# Patient Record
Sex: FEMALE | Race: WHITE | NOT HISPANIC OR LATINO | Employment: OTHER | URBAN - METROPOLITAN AREA
[De-identification: names, ages, dates, MRNs, and addresses within clinical notes are randomized per-mention and may not be internally consistent; named-entity substitution may affect disease eponyms.]

---

## 2023-01-19 PROBLEM — N95.1 POST MENOPAUSAL SYNDROME: Status: ACTIVE | Noted: 2023-01-19

## 2023-01-19 PROBLEM — E66.811 CLASS 1 OBESITY WITH BODY MASS INDEX (BMI) OF 30.0 TO 30.9 IN ADULT: Status: ACTIVE | Noted: 2023-01-19

## 2023-01-19 PROBLEM — E66.9 CLASS 1 OBESITY WITH BODY MASS INDEX (BMI) OF 30.0 TO 30.9 IN ADULT: Status: ACTIVE | Noted: 2023-01-19

## 2023-01-19 PROBLEM — I67.89 CEREBRAL MICROVASCULAR DISEASE: Status: ACTIVE | Noted: 2023-01-19

## 2023-01-19 PROBLEM — N18.31 STAGE 3A CHRONIC KIDNEY DISEASE (MULTI): Status: ACTIVE | Noted: 2023-01-19

## 2023-01-19 PROBLEM — R51.9 HEADACHE: Status: ACTIVE | Noted: 2023-01-19

## 2023-01-19 PROBLEM — E11.22 TYPE 2 DIABETES MELLITUS WITH STAGE 3A CHRONIC KIDNEY DISEASE, WITHOUT LONG-TERM CURRENT USE OF INSULIN (MULTI): Status: ACTIVE | Noted: 2023-01-19

## 2023-01-19 PROBLEM — D64.9 ANEMIA: Status: ACTIVE | Noted: 2023-01-19

## 2023-01-19 PROBLEM — E78.5 HYPERLIPIDEMIA: Status: ACTIVE | Noted: 2023-01-19

## 2023-01-19 PROBLEM — M77.8 TENDONITIS OF WRIST, RIGHT: Status: ACTIVE | Noted: 2023-01-19

## 2023-01-19 PROBLEM — K63.5 COLON POLYP: Status: ACTIVE | Noted: 2023-01-19

## 2023-01-19 PROBLEM — E78.5 HYPERLIPIDEMIA ASSOCIATED WITH TYPE 2 DIABETES MELLITUS (MULTI): Status: ACTIVE | Noted: 2023-01-19

## 2023-01-19 PROBLEM — K76.0 FATTY LIVER: Status: ACTIVE | Noted: 2023-01-19

## 2023-01-19 PROBLEM — E55.9 VITAMIN D DEFICIENCY: Status: ACTIVE | Noted: 2023-01-19

## 2023-01-19 PROBLEM — I10 BENIGN HYPERTENSION: Status: ACTIVE | Noted: 2023-01-19

## 2023-01-19 PROBLEM — N18.31 TYPE 2 DIABETES MELLITUS WITH STAGE 3A CHRONIC KIDNEY DISEASE, WITHOUT LONG-TERM CURRENT USE OF INSULIN (MULTI): Status: ACTIVE | Noted: 2023-01-19

## 2023-01-19 PROBLEM — M25.50 ARTHRALGIA: Status: ACTIVE | Noted: 2023-01-19

## 2023-01-19 PROBLEM — E11.69 HYPERLIPIDEMIA ASSOCIATED WITH TYPE 2 DIABETES MELLITUS (MULTI): Status: ACTIVE | Noted: 2023-01-19

## 2023-01-19 RX ORDER — CHOLECALCIFEROL (VITAMIN D3) 125 MCG
1 CAPSULE ORAL DAILY
COMMUNITY

## 2023-01-19 RX ORDER — LISINOPRIL 20 MG/1
1 TABLET ORAL DAILY
COMMUNITY
Start: 2015-05-21 | End: 2023-03-28 | Stop reason: SDUPTHER

## 2023-01-19 RX ORDER — FERROUS GLUCONATE 324(38)MG
1 TABLET ORAL EVERY OTHER DAY
COMMUNITY
Start: 2022-07-18 | End: 2024-05-08 | Stop reason: ALTCHOICE

## 2023-01-19 RX ORDER — METFORMIN HYDROCHLORIDE 500 MG/1
1 TABLET, EXTENDED RELEASE ORAL DAILY
COMMUNITY
Start: 2022-07-18 | End: 2023-04-03 | Stop reason: SDUPTHER

## 2023-01-19 RX ORDER — ATORVASTATIN CALCIUM 80 MG/1
1 TABLET, FILM COATED ORAL DAILY
COMMUNITY
Start: 2020-12-30 | End: 2023-03-28 | Stop reason: SDUPTHER

## 2023-01-19 RX ORDER — AMLODIPINE BESYLATE 10 MG/1
1 TABLET ORAL DAILY
COMMUNITY
Start: 2021-01-04 | End: 2023-03-28 | Stop reason: SDUPTHER

## 2023-01-19 RX ORDER — BACLOFEN 20 MG
1 TABLET ORAL DAILY
COMMUNITY
Start: 2019-12-09 | End: 2024-05-08 | Stop reason: ALTCHOICE

## 2023-01-19 RX ORDER — MULTIVIT-MIN/IRON/FOLIC ACID/K 18-600-40
1 CAPSULE ORAL EVERY OTHER DAY
COMMUNITY
Start: 2019-12-09 | End: 2023-11-01 | Stop reason: ALTCHOICE

## 2023-01-19 RX ORDER — ASPIRIN 81 MG/1
1 TABLET ORAL
COMMUNITY
Start: 2015-05-21

## 2023-03-07 ENCOUNTER — APPOINTMENT (OUTPATIENT)
Dept: PRIMARY CARE | Facility: CLINIC | Age: 68
End: 2023-03-07
Payer: MEDICARE

## 2023-03-28 DIAGNOSIS — E78.5 HYPERLIPIDEMIA ASSOCIATED WITH TYPE 2 DIABETES MELLITUS (MULTI): ICD-10-CM

## 2023-03-28 DIAGNOSIS — E11.69 HYPERLIPIDEMIA ASSOCIATED WITH TYPE 2 DIABETES MELLITUS (MULTI): ICD-10-CM

## 2023-03-28 DIAGNOSIS — I10 BENIGN HYPERTENSION: ICD-10-CM

## 2023-03-28 RX ORDER — LISINOPRIL 20 MG/1
20 TABLET ORAL DAILY
Qty: 90 TABLET | Refills: 0 | Status: SHIPPED | OUTPATIENT
Start: 2023-03-28 | End: 2023-04-03 | Stop reason: SDUPTHER

## 2023-03-28 RX ORDER — AMLODIPINE BESYLATE 10 MG/1
10 TABLET ORAL DAILY
Qty: 90 TABLET | Refills: 0 | Status: SHIPPED | OUTPATIENT
Start: 2023-03-28 | End: 2023-04-03 | Stop reason: SDUPTHER

## 2023-03-28 RX ORDER — ATORVASTATIN CALCIUM 80 MG/1
80 TABLET, FILM COATED ORAL DAILY
Qty: 90 TABLET | Refills: 0 | Status: SHIPPED | OUTPATIENT
Start: 2023-03-28 | End: 2023-04-03 | Stop reason: SDUPTHER

## 2023-03-28 RX ORDER — LANOLIN ALCOHOL/MO/W.PET/CERES
CREAM (GRAM) TOPICAL
COMMUNITY
End: 2024-05-08 | Stop reason: ALTCHOICE

## 2023-03-28 ASSESSMENT — ENCOUNTER SYMPTOMS
LOSS OF SENSATION IN FEET: 0
DEPRESSION: 0
OCCASIONAL FEELINGS OF UNSTEADINESS: 0

## 2023-04-01 LAB
ALANINE AMINOTRANSFERASE (SGPT) (U/L) IN SER/PLAS: 11 U/L (ref 7–45)
ALBUMIN (G/DL) IN SER/PLAS: 4.3 G/DL (ref 3.4–5)
ALKALINE PHOSPHATASE (U/L) IN SER/PLAS: 53 U/L (ref 33–136)
ANION GAP IN SER/PLAS: 11 MMOL/L (ref 10–20)
ASPARTATE AMINOTRANSFERASE (SGOT) (U/L) IN SER/PLAS: 15 U/L (ref 9–39)
BASOPHILS (10*3/UL) IN BLOOD BY AUTOMATED COUNT: 0.05 X10E9/L (ref 0–0.1)
BASOPHILS/100 LEUKOCYTES IN BLOOD BY AUTOMATED COUNT: 1.3 % (ref 0–2)
BILIRUBIN TOTAL (MG/DL) IN SER/PLAS: 0.9 MG/DL (ref 0–1.2)
CALCIDIOL (25 OH VITAMIN D3) (NG/ML) IN SER/PLAS: 62 NG/ML
CALCIUM (MG/DL) IN SER/PLAS: 9.7 MG/DL (ref 8.6–10.6)
CARBON DIOXIDE, TOTAL (MMOL/L) IN SER/PLAS: 28 MMOL/L (ref 21–32)
CHLORIDE (MMOL/L) IN SER/PLAS: 103 MMOL/L (ref 98–107)
CHOLESTEROL (MG/DL) IN SER/PLAS: 146 MG/DL (ref 0–199)
CHOLESTEROL IN HDL (MG/DL) IN SER/PLAS: 58.1 MG/DL
CHOLESTEROL/HDL RATIO: 2.5
CREATININE (MG/DL) IN SER/PLAS: 1 MG/DL (ref 0.5–1.05)
EOSINOPHILS (10*3/UL) IN BLOOD BY AUTOMATED COUNT: 0.24 X10E9/L (ref 0–0.7)
EOSINOPHILS/100 LEUKOCYTES IN BLOOD BY AUTOMATED COUNT: 6.5 % (ref 0–6)
ERYTHROCYTE DISTRIBUTION WIDTH (RATIO) BY AUTOMATED COUNT: 14.7 % (ref 11.5–14.5)
ERYTHROCYTE MEAN CORPUSCULAR HEMOGLOBIN CONCENTRATION (G/DL) BY AUTOMATED: 32.5 G/DL (ref 32–36)
ERYTHROCYTE MEAN CORPUSCULAR VOLUME (FL) BY AUTOMATED COUNT: 88 FL (ref 80–100)
ERYTHROCYTES (10*6/UL) IN BLOOD BY AUTOMATED COUNT: 4.26 X10E12/L (ref 4–5.2)
ESTIMATED AVERAGE GLUCOSE FOR HBA1C: 131 MG/DL
GFR FEMALE: 61 ML/MIN/1.73M2
GLUCOSE (MG/DL) IN SER/PLAS: 99 MG/DL (ref 74–99)
HEMATOCRIT (%) IN BLOOD BY AUTOMATED COUNT: 37.5 % (ref 36–46)
HEMOGLOBIN (G/DL) IN BLOOD: 12.2 G/DL (ref 12–16)
HEMOGLOBIN A1C/HEMOGLOBIN TOTAL IN BLOOD: 6.2 %
IMMATURE GRANULOCYTES/100 LEUKOCYTES IN BLOOD BY AUTOMATED COUNT: 0.3 % (ref 0–0.9)
LDL: 71 MG/DL (ref 0–99)
LEUKOCYTES (10*3/UL) IN BLOOD BY AUTOMATED COUNT: 3.7 X10E9/L (ref 4.4–11.3)
LYMPHOCYTES (10*3/UL) IN BLOOD BY AUTOMATED COUNT: 1.69 X10E9/L (ref 1.2–4.8)
LYMPHOCYTES/100 LEUKOCYTES IN BLOOD BY AUTOMATED COUNT: 45.4 % (ref 13–44)
MONOCYTES (10*3/UL) IN BLOOD BY AUTOMATED COUNT: 0.46 X10E9/L (ref 0.1–1)
MONOCYTES/100 LEUKOCYTES IN BLOOD BY AUTOMATED COUNT: 12.4 % (ref 2–10)
NEUTROPHILS (10*3/UL) IN BLOOD BY AUTOMATED COUNT: 1.27 X10E9/L (ref 1.2–7.7)
NEUTROPHILS/100 LEUKOCYTES IN BLOOD BY AUTOMATED COUNT: 34.1 % (ref 40–80)
NRBC (PER 100 WBCS) BY AUTOMATED COUNT: 0 /100 WBC (ref 0–0)
PLATELETS (10*3/UL) IN BLOOD AUTOMATED COUNT: 202 X10E9/L (ref 150–450)
POTASSIUM (MMOL/L) IN SER/PLAS: 4.3 MMOL/L (ref 3.5–5.3)
PROTEIN TOTAL: 6.9 G/DL (ref 6.4–8.2)
SODIUM (MMOL/L) IN SER/PLAS: 138 MMOL/L (ref 136–145)
TRIGLYCERIDE (MG/DL) IN SER/PLAS: 87 MG/DL (ref 0–149)
URATE (MG/DL) IN SER/PLAS: 5.9 MG/DL (ref 2.3–6.7)
UREA NITROGEN (MG/DL) IN SER/PLAS: 19 MG/DL (ref 6–23)
VLDL: 17 MG/DL (ref 0–40)

## 2023-04-03 ENCOUNTER — OFFICE VISIT (OUTPATIENT)
Dept: PRIMARY CARE | Facility: CLINIC | Age: 68
End: 2023-04-03
Payer: MEDICARE

## 2023-04-03 VITALS
TEMPERATURE: 97.2 F | OXYGEN SATURATION: 98 % | WEIGHT: 167 LBS | HEIGHT: 64 IN | RESPIRATION RATE: 16 BRPM | BODY MASS INDEX: 28.51 KG/M2 | DIASTOLIC BLOOD PRESSURE: 70 MMHG | HEART RATE: 65 BPM | SYSTOLIC BLOOD PRESSURE: 124 MMHG

## 2023-04-03 DIAGNOSIS — E11.22 TYPE 2 DIABETES MELLITUS WITH STAGE 3A CHRONIC KIDNEY DISEASE, WITHOUT LONG-TERM CURRENT USE OF INSULIN (MULTI): ICD-10-CM

## 2023-04-03 DIAGNOSIS — Z12.11 SCREENING FOR COLORECTAL CANCER: ICD-10-CM

## 2023-04-03 DIAGNOSIS — Z13.6 SCREENING FOR CARDIOVASCULAR CONDITION: ICD-10-CM

## 2023-04-03 DIAGNOSIS — I10 BENIGN HYPERTENSION: ICD-10-CM

## 2023-04-03 DIAGNOSIS — Z11.59 NEED FOR HEPATITIS C SCREENING TEST: ICD-10-CM

## 2023-04-03 DIAGNOSIS — E78.5 HYPERLIPIDEMIA ASSOCIATED WITH TYPE 2 DIABETES MELLITUS (MULTI): ICD-10-CM

## 2023-04-03 DIAGNOSIS — Z11.4 SCREENING FOR HIV WITHOUT PRESENCE OF RISK FACTORS: ICD-10-CM

## 2023-04-03 DIAGNOSIS — Z13.89 SCREENING FOR MULTIPLE CONDITIONS: ICD-10-CM

## 2023-04-03 DIAGNOSIS — Z00.00 ROUTINE GENERAL MEDICAL EXAMINATION AT A HEALTH CARE FACILITY: ICD-10-CM

## 2023-04-03 DIAGNOSIS — N18.31 TYPE 2 DIABETES MELLITUS WITH STAGE 3A CHRONIC KIDNEY DISEASE, WITHOUT LONG-TERM CURRENT USE OF INSULIN (MULTI): ICD-10-CM

## 2023-04-03 DIAGNOSIS — Z12.12 SCREENING FOR COLORECTAL CANCER: ICD-10-CM

## 2023-04-03 DIAGNOSIS — Z00.00 ROUTINE GENERAL MEDICAL EXAMINATION AT HEALTH CARE FACILITY: Primary | ICD-10-CM

## 2023-04-03 DIAGNOSIS — Z78.0 ASYMPTOMATIC MENOPAUSAL STATE: ICD-10-CM

## 2023-04-03 DIAGNOSIS — E11.69 HYPERLIPIDEMIA ASSOCIATED WITH TYPE 2 DIABETES MELLITUS (MULTI): ICD-10-CM

## 2023-04-03 PROBLEM — E66.9 CLASS 1 OBESITY WITH BODY MASS INDEX (BMI) OF 30.0 TO 30.9 IN ADULT: Status: RESOLVED | Noted: 2023-01-19 | Resolved: 2023-04-03

## 2023-04-03 PROBLEM — E66.811 CLASS 1 OBESITY WITH BODY MASS INDEX (BMI) OF 30.0 TO 30.9 IN ADULT: Status: RESOLVED | Noted: 2023-01-19 | Resolved: 2023-04-03

## 2023-04-03 PROCEDURE — 1170F FXNL STATUS ASSESSED: CPT | Performed by: FAMILY MEDICINE

## 2023-04-03 PROCEDURE — 2028F FOOT EXAM PERFORMED: CPT | Performed by: FAMILY MEDICINE

## 2023-04-03 PROCEDURE — 4010F ACE/ARB THERAPY RXD/TAKEN: CPT | Performed by: FAMILY MEDICINE

## 2023-04-03 PROCEDURE — 3074F SYST BP LT 130 MM HG: CPT | Performed by: FAMILY MEDICINE

## 2023-04-03 PROCEDURE — 99397 PER PM REEVAL EST PAT 65+ YR: CPT | Performed by: FAMILY MEDICINE

## 2023-04-03 PROCEDURE — 1160F RVW MEDS BY RX/DR IN RCRD: CPT | Performed by: FAMILY MEDICINE

## 2023-04-03 PROCEDURE — 99497 ADVNCD CARE PLAN 30 MIN: CPT | Performed by: FAMILY MEDICINE

## 2023-04-03 PROCEDURE — 1159F MED LIST DOCD IN RCRD: CPT | Performed by: FAMILY MEDICINE

## 2023-04-03 PROCEDURE — 3078F DIAST BP <80 MM HG: CPT | Performed by: FAMILY MEDICINE

## 2023-04-03 PROCEDURE — 99214 OFFICE O/P EST MOD 30 MIN: CPT | Performed by: FAMILY MEDICINE

## 2023-04-03 PROCEDURE — 1036F TOBACCO NON-USER: CPT | Performed by: FAMILY MEDICINE

## 2023-04-03 PROCEDURE — 3044F HG A1C LEVEL LT 7.0%: CPT | Performed by: FAMILY MEDICINE

## 2023-04-03 PROCEDURE — G0439 PPPS, SUBSEQ VISIT: HCPCS | Performed by: FAMILY MEDICINE

## 2023-04-03 RX ORDER — AMLODIPINE BESYLATE 10 MG/1
10 TABLET ORAL DAILY
Qty: 90 TABLET | Refills: 1 | Status: SHIPPED | OUTPATIENT
Start: 2023-04-03 | End: 2023-12-13

## 2023-04-03 RX ORDER — ATORVASTATIN CALCIUM 80 MG/1
80 TABLET, FILM COATED ORAL DAILY
Qty: 90 TABLET | Refills: 1 | Status: SHIPPED | OUTPATIENT
Start: 2023-04-03 | End: 2023-07-03

## 2023-04-03 RX ORDER — METFORMIN HYDROCHLORIDE 500 MG/1
500 TABLET, EXTENDED RELEASE ORAL DAILY
Qty: 90 TABLET | Refills: 1 | Status: SHIPPED | OUTPATIENT
Start: 2023-04-03 | End: 2023-10-03 | Stop reason: SDUPTHER

## 2023-04-03 RX ORDER — LISINOPRIL 20 MG/1
20 TABLET ORAL DAILY
Qty: 90 TABLET | Refills: 1 | Status: SHIPPED | OUTPATIENT
Start: 2023-04-03 | End: 2023-06-24

## 2023-04-03 ASSESSMENT — ENCOUNTER SYMPTOMS
MYALGIAS: 0
AGITATION: 0
DIAPHORESIS: 0
FLANK PAIN: 0
LOSS OF SENSATION IN FEET: 0
DYSURIA: 0
EYE REDNESS: 0
APPETITE CHANGE: 0
VOMITING: 0
CHEST TIGHTNESS: 0
SORE THROAT: 0
ARTHRALGIAS: 0
FEVER: 0
SINUS PRESSURE: 0
TROUBLE SWALLOWING: 0
BACK PAIN: 0
DIARRHEA: 0
NERVOUS/ANXIOUS: 0
RHINORRHEA: 0
JOINT SWELLING: 0
PALPITATIONS: 0
SLEEP DISTURBANCE: 0
COLOR CHANGE: 0
WOUND: 0
EYE ITCHING: 0
FACIAL SWELLING: 0
NAUSEA: 0
SHORTNESS OF BREATH: 0
SINUS PAIN: 0
NECK STIFFNESS: 0
POLYDIPSIA: 0
CHILLS: 0
FREQUENCY: 0
EYE DISCHARGE: 0
UNEXPECTED WEIGHT CHANGE: 0
BRUISES/BLEEDS EASILY: 0
OCCASIONAL FEELINGS OF UNSTEADINESS: 0
ABDOMINAL PAIN: 0
FATIGUE: 0
BLOOD IN STOOL: 0
VOICE CHANGE: 0
POLYPHAGIA: 0
COUGH: 0
HEMATURIA: 0
CONSTIPATION: 0
EYE PAIN: 0
DIZZINESS: 0
DEPRESSION: 0
ACTIVITY CHANGE: 0
WHEEZING: 0
ADENOPATHY: 0

## 2023-04-03 ASSESSMENT — ACTIVITIES OF DAILY LIVING (ADL)
TAKING_MEDICATION: INDEPENDENT
DOING_HOUSEWORK: INDEPENDENT
GROCERY_SHOPPING: INDEPENDENT
MANAGING_FINANCES: INDEPENDENT
DRESSING: INDEPENDENT
BATHING: INDEPENDENT

## 2023-04-03 ASSESSMENT — PATIENT HEALTH QUESTIONNAIRE - PHQ9
SUM OF ALL RESPONSES TO PHQ9 QUESTIONS 1 AND 2: 0
1. LITTLE INTEREST OR PLEASURE IN DOING THINGS: NOT AT ALL
2. FEELING DOWN, DEPRESSED OR HOPELESS: NOT AT ALL

## 2023-04-03 NOTE — PATIENT INSTRUCTIONS
Your doctor may have recommended that you participate in a CDC-recognized lifestyle change program. If your physician made a referral to the program please follow the scheduling instructions that you received to schedule your appointment.     For more information about the program please also review the document located here: [email or web address]://[email or web address]-[email or web address]/doc/prevent-diabetes-[email or web address]-you-[email or web address]-prediabetes-now-what.[email or web address]     Prediabetes: After Your Visit Your Care Instructions     Prediabetes is a warning sign that you are at risk for getting type 2 diabetes. It means that your blood sugar is higher than it should be. Most people who get type 2 diabetes have prediabetes first. The good news is that lifestyle changes may help you get your blood sugar back to normal and avoid or delay diabetes. Also, pregnant women who get gestational diabetes may have prediabetes first.     Type 2 diabetes is a lifelong disease in which the body does not respond properly to a hormone called insulin or does not make enough of the hormone. Insulin helps sugar from your food enter your body cells to be used as energy.     Without insulin, the sugar cannot get into the cells to do its work. It stays in the blood instead. This can cause high blood sugar levels. A person has diabetes when the blood sugar stays too high too much of the time.     Follow-up care is a key part of your treatment and safety. Be sure to make and go to all appointments, and call your doctor if you are having problems. It’s also a good idea to know your test results and keep a list of the medicines you take.   How can you care for yourself at home?     · Watch your weight. A healthy weight helps your body use insulin properly.   · Eat a balanced diet. This may help you prevent or delay diabetes. Try to eat an even amount of carbohydrate throughout the day. This can help you avoid sudden peaks in blood sugar.   · Ask your doctor if you should see a dietitian.  A registered dietitian can help you develop a meal plan that fits your lifestyle.   · Get at least 30 minutes of exercise on most days of the week. Exercise helps control your blood sugar. It also helps you maintain a healthy weight. Walking is a good choice. You also may want to do other activities, such as running, swimming, cycling, or playing tennis or team sports.   · Do not smoke. Smoking can make prediabetes worse. If you need help quitting, talk to your doctor about stop-smoking programs and medicines. These can increase your chances of quitting for good.   · If your doctor prescribed medicines, take them exactly as prescribed. Call your doctor if you think you are having a problem with your medicine. You will get more details on the specific medicines your doctor prescribes.     When should you call for help?   Watch closely for changes in your health, and be sure to contact your doctor if:   · You have any symptoms of diabetes. These may include:   ¨ Being thirsty more often.   ¨ Urinating more.   ¨ Being hungrier.   ¨ Losing weight.   ¨ Being very tired.   ¨ Having blurry vision.   · You have a wound that will not heal.   · You have an infection that will not go away.   · You have problems with your blood pressure.   · You want more information about diabetes and how you can keep from getting it.

## 2023-04-03 NOTE — PROGRESS NOTES
Below is the patient's most recent value for Albumin, ALT, AST, BUN, Calcium, Chloride, Cholesterol, CO2, Creatinine, GFR, Glucose, HDL, Hematocrit, Hemoglobin, Hemoglobin A1C, LDL, Magnesium, Phosphorus, Platelets, Potassium, PSA, Sodium, Triglycerides, and WBC.   Lab Results   Component Value Date    ALBUMIN 4.3 04/01/2023    ALT 11 04/01/2023    AST 15 04/01/2023    BUN 19 04/01/2023    CALCIUM 9.7 04/01/2023     04/01/2023    CHOL 146 04/01/2023    CO2 28 04/01/2023    CREATININE 1.00 04/01/2023    HDL 58.1 04/01/2023    HCT 37.5 04/01/2023    HGB 12.2 04/01/2023    HGBA1C 6.2 (A) 04/01/2023    MG 1.99 11/21/2022     04/01/2023    K 4.3 04/01/2023     04/01/2023    TRIG 87 04/01/2023    WBC 3.7 (L) 04/01/2023     Note: for a comprehensive list of the patient's lab results, access the Results Review activity.Subjective   Reason for Visit: Grecia Pineda is an 68 y.o. female here for a Medicare Wellness visit.     Past Medical, Surgical, and Family History reviewed and updated in chart.    Reviewed all medications by prescribing practitioner or clinical pharmacist (such as prescriptions, OTCs, herbal therapies and supplements) and documented in the medical record.    HPI    Patient Care Team:  Melva Cobb MD as PCP - General  Melva Cobb MD as PCP - [employer] PCP     Review of Systems   Constitutional:  Negative for activity change, appetite change, chills, diaphoresis, fatigue, fever and unexpected weight change.   HENT:  Negative for congestion, dental problem, drooling, ear discharge, ear pain, facial swelling, hearing loss, nosebleeds, postnasal drip, rhinorrhea, sinus pressure, sinus pain, sneezing, sore throat, tinnitus, trouble swallowing and voice change.    Eyes:  Negative for pain, discharge, redness, itching and visual disturbance.   Respiratory:  Negative for cough, chest tightness, shortness of breath and wheezing.    Cardiovascular:  Negative for chest pain,  "palpitations and leg swelling.   Gastrointestinal:  Negative for abdominal pain, blood in stool, constipation, diarrhea, nausea and vomiting.   Endocrine: Negative for cold intolerance, heat intolerance, polydipsia, polyphagia and polyuria.   Genitourinary:  Negative for decreased urine volume, dysuria, flank pain, frequency, hematuria and urgency.   Musculoskeletal:  Negative for arthralgias, back pain, gait problem, joint swelling, myalgias and neck stiffness.   Skin:  Negative for color change, pallor, rash and wound.   Neurological:  Negative for dizziness.   Hematological:  Negative for adenopathy. Does not bruise/bleed easily.   Psychiatric/Behavioral:  Negative for agitation, behavioral problems and sleep disturbance. The patient is not nervous/anxious.        Objective   Vitals:  /70   Pulse 65   Temp 36.2 °C (97.2 °F)   Resp 16   Ht 1.626 m (5' 4\")   Wt 75.8 kg (167 lb)   SpO2 98%   BMI 28.67 kg/m²       Physical Exam  Vitals and nursing note reviewed.   Constitutional:       General: She is not in acute distress.     Appearance: Normal appearance. She is normal weight. She is not ill-appearing or toxic-appearing.   HENT:      Head: Normocephalic.      Right Ear: Tympanic membrane, ear canal and external ear normal. There is no impacted cerumen.      Left Ear: Tympanic membrane, ear canal and external ear normal. There is no impacted cerumen.      Nose: Nose normal. No congestion or rhinorrhea.      Mouth/Throat:      Mouth: Mucous membranes are moist.      Pharynx: Oropharynx is clear. No oropharyngeal exudate or posterior oropharyngeal erythema.   Eyes:      General: No scleral icterus.        Right eye: No discharge.         Left eye: No discharge.      Extraocular Movements: Extraocular movements intact.      Conjunctiva/sclera: Conjunctivae normal.      Pupils: Pupils are equal, round, and reactive to light.   Neck:      Vascular: No carotid bruit.   Cardiovascular:      Rate and Rhythm: " Normal rate and regular rhythm.      Pulses: Normal pulses.      Heart sounds: Normal heart sounds. No murmur heard.     No gallop.   Pulmonary:      Effort: No respiratory distress.      Breath sounds: No wheezing or rhonchi.   Chest:      Chest wall: No tenderness.   Abdominal:      General: Abdomen is flat. Bowel sounds are normal.      Palpations: Abdomen is soft. There is no mass.      Tenderness: There is no abdominal tenderness. There is no guarding or rebound.      Hernia: No hernia is present.   Musculoskeletal:         General: No swelling or tenderness. Normal range of motion.      Cervical back: Normal range of motion. No rigidity or tenderness.      Right lower leg: No edema.      Left lower leg: No edema.   Lymphadenopathy:      Cervical: No cervical adenopathy.   Skin:     General: Skin is warm and dry.      Coloration: Skin is not pale.      Findings: No bruising, erythema or rash.   Neurological:      General: No focal deficit present.      Mental Status: She is alert and oriented to person, place, and time.      Sensory: No sensory deficit.      Coordination: Coordination normal.      Gait: Gait normal.      Deep Tendon Reflexes: Reflexes normal.   Psychiatric:         Mood and Affect: Mood normal.         Behavior: Behavior normal.         Thought Content: Thought content normal.         Judgment: Judgment normal.         Assessment/Plan   Problem List Items Addressed This Visit       Benign hypertension    Relevant Medications    lisinopril 20 mg tablet    amLODIPine (Norvasc) 10 mg tablet    Hyperlipidemia associated with type 2 diabetes mellitus (CMS/HCC)    Relevant Medications    atorvastatin (Lipitor) 80 mg tablet    Type 2 diabetes mellitus with stage 3a chronic kidney disease, without long-term current use of insulin (CMS/HCC)    Relevant Medications    metFORMIN XR (Glucophage-XR) 500 mg 24 hr tablet    Other Relevant Orders     Diabetes Foot Exam (Completed)    3 Month Follow Up In  [facility] - P[facility]     Other Visit Diagnoses       Routine general medical examination at health care facility    -  Primary    Screening for multiple conditions        Screening for cardiovascular condition        Asymptomatic menopausal state        Need for hepatitis C screening test        Relevant Orders    Hepatitis C antibody    Routine general medical examination at a health care facility        Screening for colorectal cancer        Relevant Orders    Fecal Occult Bld Immunoassay    Screening for HIV without presence of risk factors        Relevant Orders    HIV-1 and HIV-2 antibodies        Patient has some concerns about leg cramps.  Advised her to adjust the way she sits in bed.  Continue wearing good supportive shoes.  May be drink some electrolytes on the days she is very busy.  Call back if this continues may need physical therapy or an x-ray of her lower back.    We will follow-up with her in 3 months for diabetes checkup.  Need [facility] exam [facility]  Did advise her to get shingles vaccine  Discussed calcium scoring test however she would like to wait until next visit.

## 2023-04-03 NOTE — PROGRESS NOTES
"Subjective   Patient ID: Grecia Pineda is a 68 y.o. female who presents for Medicare Annual Wellness Visit Subsequent.  Today she is accompanied by {alone or w :808324}.     HPI    Review of Systems    Objective   /70   Pulse 65   Temp 36.2 °C (97.2 °F)   Resp 16   Ht 1.626 m (5' 4\")   Wt 75.8 kg (167 lb)   SpO2 98%   BMI 28.67 kg/m²   BSA: 1.85 meters squared  Growth percentiles: Facility age limit for growth %kimberlyn is 20 years. Facility age limit for growth %kimberlyn is 20 years.     Physical Exam    Assessment/Plan   {Assess/PlanSmartLinks:40045}  "

## 2023-06-23 DIAGNOSIS — I10 BENIGN HYPERTENSION: ICD-10-CM

## 2023-06-24 RX ORDER — LISINOPRIL 20 MG/1
TABLET ORAL
Qty: 90 TABLET | Refills: 0 | Status: SHIPPED | OUTPATIENT
Start: 2023-06-24 | End: 2023-12-13

## 2023-06-29 ENCOUNTER — LAB (OUTPATIENT)
Dept: LAB | Facility: LAB | Age: 68
End: 2023-06-29
Payer: MEDICARE

## 2023-06-29 DIAGNOSIS — Z11.4 SCREENING FOR HIV WITHOUT PRESENCE OF RISK FACTORS: ICD-10-CM

## 2023-06-29 DIAGNOSIS — Z11.59 NEED FOR HEPATITIS C SCREENING TEST: ICD-10-CM

## 2023-06-29 LAB
HEPATITIS C VIRUS AB PRESENCE IN SERUM: NONREACTIVE
HIV 1/ 2 AG/AB SCREEN: NONREACTIVE

## 2023-06-29 PROCEDURE — 86803 HEPATITIS C AB TEST: CPT

## 2023-06-29 PROCEDURE — 36415 COLL VENOUS BLD VENIPUNCTURE: CPT

## 2023-06-29 PROCEDURE — 87389 HIV-1 AG W/HIV-1&-2 AB AG IA: CPT

## 2023-07-03 ENCOUNTER — LAB (OUTPATIENT)
Dept: LAB | Facility: LAB | Age: 68
End: 2023-07-03
Payer: MEDICARE

## 2023-07-03 ENCOUNTER — OFFICE VISIT (OUTPATIENT)
Dept: PRIMARY CARE | Facility: CLINIC | Age: 68
End: 2023-07-03
Payer: MEDICARE

## 2023-07-03 VITALS
RESPIRATION RATE: 16 BRPM | HEART RATE: 76 BPM | BODY MASS INDEX: 27.14 KG/M2 | WEIGHT: 159 LBS | SYSTOLIC BLOOD PRESSURE: 112 MMHG | DIASTOLIC BLOOD PRESSURE: 74 MMHG | HEIGHT: 64 IN | TEMPERATURE: 97.1 F | OXYGEN SATURATION: 97 %

## 2023-07-03 DIAGNOSIS — N18.31 TYPE 2 DIABETES MELLITUS WITH STAGE 3A CHRONIC KIDNEY DISEASE, WITHOUT LONG-TERM CURRENT USE OF INSULIN (MULTI): ICD-10-CM

## 2023-07-03 DIAGNOSIS — M54.41 CHRONIC BILATERAL LOW BACK PAIN WITH BILATERAL SCIATICA: ICD-10-CM

## 2023-07-03 DIAGNOSIS — E11.22 TYPE 2 DIABETES MELLITUS WITH STAGE 3A CHRONIC KIDNEY DISEASE, WITHOUT LONG-TERM CURRENT USE OF INSULIN (MULTI): ICD-10-CM

## 2023-07-03 DIAGNOSIS — I10 BENIGN HYPERTENSION: ICD-10-CM

## 2023-07-03 DIAGNOSIS — R25.2 BILATERAL LEG CRAMPS: ICD-10-CM

## 2023-07-03 DIAGNOSIS — E11.22 TYPE 2 DIABETES MELLITUS WITH STAGE 3A CHRONIC KIDNEY DISEASE, WITHOUT LONG-TERM CURRENT USE OF INSULIN (MULTI): Primary | ICD-10-CM

## 2023-07-03 DIAGNOSIS — E11.69 HYPERLIPIDEMIA ASSOCIATED WITH TYPE 2 DIABETES MELLITUS (MULTI): ICD-10-CM

## 2023-07-03 DIAGNOSIS — E78.5 HYPERLIPIDEMIA, UNSPECIFIED HYPERLIPIDEMIA TYPE: ICD-10-CM

## 2023-07-03 DIAGNOSIS — G89.29 CHRONIC BILATERAL LOW BACK PAIN WITH BILATERAL SCIATICA: ICD-10-CM

## 2023-07-03 DIAGNOSIS — N18.31 STAGE 3A CHRONIC KIDNEY DISEASE (MULTI): ICD-10-CM

## 2023-07-03 DIAGNOSIS — N18.31 TYPE 2 DIABETES MELLITUS WITH STAGE 3A CHRONIC KIDNEY DISEASE, WITHOUT LONG-TERM CURRENT USE OF INSULIN (MULTI): Primary | ICD-10-CM

## 2023-07-03 DIAGNOSIS — M25.50 MULTIPLE JOINT PAIN: ICD-10-CM

## 2023-07-03 DIAGNOSIS — E53.8 VITAMIN B 12 DEFICIENCY: ICD-10-CM

## 2023-07-03 DIAGNOSIS — M54.42 CHRONIC BILATERAL LOW BACK PAIN WITH BILATERAL SCIATICA: ICD-10-CM

## 2023-07-03 DIAGNOSIS — E78.5 HYPERLIPIDEMIA ASSOCIATED WITH TYPE 2 DIABETES MELLITUS (MULTI): ICD-10-CM

## 2023-07-03 DIAGNOSIS — D50.8 IRON DEFICIENCY ANEMIA SECONDARY TO INADEQUATE DIETARY IRON INTAKE: Chronic | ICD-10-CM

## 2023-07-03 LAB
ALANINE AMINOTRANSFERASE (SGPT) (U/L) IN SER/PLAS: 11 U/L (ref 7–45)
ALBUMIN (G/DL) IN SER/PLAS: 4.5 G/DL (ref 3.4–5)
ALKALINE PHOSPHATASE (U/L) IN SER/PLAS: 52 U/L (ref 33–136)
ANION GAP IN SER/PLAS: 14 MMOL/L (ref 10–20)
ASPARTATE AMINOTRANSFERASE (SGOT) (U/L) IN SER/PLAS: 14 U/L (ref 9–39)
BASOPHILS (10*3/UL) IN BLOOD BY AUTOMATED COUNT: 0.04 X10E9/L (ref 0–0.1)
BASOPHILS/100 LEUKOCYTES IN BLOOD BY AUTOMATED COUNT: 1.2 % (ref 0–2)
BILIRUBIN TOTAL (MG/DL) IN SER/PLAS: 0.5 MG/DL (ref 0–1.2)
C REACTIVE PROTEIN (MG/L) IN SER/PLAS: <0.1 MG/DL
CALCIUM (MG/DL) IN SER/PLAS: 10.1 MG/DL (ref 8.6–10.6)
CARBON DIOXIDE, TOTAL (MMOL/L) IN SER/PLAS: 28 MMOL/L (ref 21–32)
CHLORIDE (MMOL/L) IN SER/PLAS: 102 MMOL/L (ref 98–107)
COBALAMIN (VITAMIN B12) (PG/ML) IN SER/PLAS: 472 PG/ML (ref 211–911)
CREATINE KINASE (U/L) IN SER/PLAS: 57 U/L (ref 0–215)
CREATININE (MG/DL) IN SER/PLAS: 1.1 MG/DL (ref 0.5–1.05)
EOSINOPHILS (10*3/UL) IN BLOOD BY AUTOMATED COUNT: 0.23 X10E9/L (ref 0–0.7)
EOSINOPHILS/100 LEUKOCYTES IN BLOOD BY AUTOMATED COUNT: 6.8 % (ref 0–6)
ERYTHROCYTE DISTRIBUTION WIDTH (RATIO) BY AUTOMATED COUNT: 14.5 % (ref 11.5–14.5)
ERYTHROCYTE MEAN CORPUSCULAR HEMOGLOBIN CONCENTRATION (G/DL) BY AUTOMATED: 32.4 G/DL (ref 32–36)
ERYTHROCYTE MEAN CORPUSCULAR VOLUME (FL) BY AUTOMATED COUNT: 89 FL (ref 80–100)
ERYTHROCYTES (10*6/UL) IN BLOOD BY AUTOMATED COUNT: 4.35 X10E12/L (ref 4–5.2)
ESTIMATED AVERAGE GLUCOSE FOR HBA1C: 134 MG/DL
FERRITIN (UG/LL) IN SER/PLAS: 98 UG/L (ref 8–150)
FOLATE (NG/ML) IN SER/PLAS: 22.8 NG/ML
GFR FEMALE: 55 ML/MIN/1.73M2
GLUCOSE (MG/DL) IN SER/PLAS: 98 MG/DL (ref 74–99)
HEMATOCRIT (%) IN BLOOD BY AUTOMATED COUNT: 38.9 % (ref 36–46)
HEMOGLOBIN (G/DL) IN BLOOD: 12.6 G/DL (ref 12–16)
HEMOGLOBIN A1C/HEMOGLOBIN TOTAL IN BLOOD: 6.3 %
IMMATURE GRANULOCYTES/100 LEUKOCYTES IN BLOOD BY AUTOMATED COUNT: 0.3 % (ref 0–0.9)
IRON (UG/DL) IN SER/PLAS: 53 UG/DL (ref 35–150)
IRON BINDING CAPACITY (UG/DL) IN SER/PLAS: 381 UG/DL (ref 240–445)
IRON SATURATION (%) IN SER/PLAS: 14 % (ref 25–45)
LEUKOCYTES (10*3/UL) IN BLOOD BY AUTOMATED COUNT: 3.4 X10E9/L (ref 4.4–11.3)
LYMPHOCYTES (10*3/UL) IN BLOOD BY AUTOMATED COUNT: 1.28 X10E9/L (ref 1.2–4.8)
LYMPHOCYTES/100 LEUKOCYTES IN BLOOD BY AUTOMATED COUNT: 37.9 % (ref 13–44)
MAGNESIUM (MG/DL) IN SER/PLAS: 2.06 MG/DL (ref 1.6–2.4)
MONOCYTES (10*3/UL) IN BLOOD BY AUTOMATED COUNT: 0.46 X10E9/L (ref 0.1–1)
MONOCYTES/100 LEUKOCYTES IN BLOOD BY AUTOMATED COUNT: 13.6 % (ref 2–10)
NEUTROPHILS (10*3/UL) IN BLOOD BY AUTOMATED COUNT: 1.36 X10E9/L (ref 1.2–7.7)
NEUTROPHILS/100 LEUKOCYTES IN BLOOD BY AUTOMATED COUNT: 40.2 % (ref 40–80)
NRBC (PER 100 WBCS) BY AUTOMATED COUNT: 0 /100 WBC (ref 0–0)
PLATELETS (10*3/UL) IN BLOOD AUTOMATED COUNT: 204 X10E9/L (ref 150–450)
POTASSIUM (MMOL/L) IN SER/PLAS: 4.8 MMOL/L (ref 3.5–5.3)
PROTEIN TOTAL: 7.4 G/DL (ref 6.4–8.2)
RHEUMATOID FACTOR (IU/ML) IN SERUM OR PLASMA: <10 IU/ML (ref 0–15)
SEDIMENTATION RATE, ERYTHROCYTE: 12 MM/H (ref 0–30)
SODIUM (MMOL/L) IN SER/PLAS: 139 MMOL/L (ref 136–145)
THYROTROPIN (MIU/L) IN SER/PLAS BY DETECTION LIMIT <= 0.05 MIU/L: 2.23 MIU/L (ref 0.44–3.98)
URATE (MG/DL) IN SER/PLAS: 6.1 MG/DL (ref 2.3–6.7)
UREA NITROGEN (MG/DL) IN SER/PLAS: 22 MG/DL (ref 6–23)

## 2023-07-03 PROCEDURE — 84443 ASSAY THYROID STIM HORMONE: CPT

## 2023-07-03 PROCEDURE — 83735 ASSAY OF MAGNESIUM: CPT

## 2023-07-03 PROCEDURE — 86140 C-REACTIVE PROTEIN: CPT

## 2023-07-03 PROCEDURE — 85652 RBC SED RATE AUTOMATED: CPT

## 2023-07-03 PROCEDURE — 1036F TOBACCO NON-USER: CPT | Performed by: FAMILY MEDICINE

## 2023-07-03 PROCEDURE — 82607 VITAMIN B-12: CPT

## 2023-07-03 PROCEDURE — 82728 ASSAY OF FERRITIN: CPT

## 2023-07-03 PROCEDURE — 1160F RVW MEDS BY RX/DR IN RCRD: CPT | Performed by: FAMILY MEDICINE

## 2023-07-03 PROCEDURE — 83550 IRON BINDING TEST: CPT

## 2023-07-03 PROCEDURE — 83036 HEMOGLOBIN GLYCOSYLATED A1C: CPT

## 2023-07-03 PROCEDURE — 3044F HG A1C LEVEL LT 7.0%: CPT | Performed by: FAMILY MEDICINE

## 2023-07-03 PROCEDURE — 85025 COMPLETE CBC W/AUTO DIFF WBC: CPT

## 2023-07-03 PROCEDURE — 3074F SYST BP LT 130 MM HG: CPT | Performed by: FAMILY MEDICINE

## 2023-07-03 PROCEDURE — 84550 ASSAY OF BLOOD/URIC ACID: CPT

## 2023-07-03 PROCEDURE — 99214 OFFICE O/P EST MOD 30 MIN: CPT | Performed by: FAMILY MEDICINE

## 2023-07-03 PROCEDURE — 83540 ASSAY OF IRON: CPT

## 2023-07-03 PROCEDURE — 82746 ASSAY OF FOLIC ACID SERUM: CPT

## 2023-07-03 PROCEDURE — 82550 ASSAY OF CK (CPK): CPT

## 2023-07-03 PROCEDURE — 80053 COMPREHEN METABOLIC PANEL: CPT

## 2023-07-03 PROCEDURE — 36415 COLL VENOUS BLD VENIPUNCTURE: CPT

## 2023-07-03 PROCEDURE — 1159F MED LIST DOCD IN RCRD: CPT | Performed by: FAMILY MEDICINE

## 2023-07-03 PROCEDURE — 3078F DIAST BP <80 MM HG: CPT | Performed by: FAMILY MEDICINE

## 2023-07-03 PROCEDURE — 4010F ACE/ARB THERAPY RXD/TAKEN: CPT | Performed by: FAMILY MEDICINE

## 2023-07-03 PROCEDURE — 2028F FOOT EXAM PERFORMED: CPT | Performed by: FAMILY MEDICINE

## 2023-07-03 PROCEDURE — 86431 RHEUMATOID FACTOR QUANT: CPT

## 2023-07-03 RX ORDER — ATORVASTATIN CALCIUM 40 MG/1
40 TABLET, FILM COATED ORAL DAILY
Qty: 90 TABLET | Refills: 1 | Status: SHIPPED | OUTPATIENT
Start: 2023-07-03 | End: 2023-12-14 | Stop reason: SDUPTHER

## 2023-07-03 ASSESSMENT — ENCOUNTER SYMPTOMS
RHINORRHEA: 0
POLYPHAGIA: 0
HEMATURIA: 0
FEVER: 0
COLOR CHANGE: 0
EYE PAIN: 0
NAUSEA: 0
LOSS OF SENSATION IN FEET: 0
APPETITE CHANGE: 0
POLYDIPSIA: 0
MYALGIAS: 1
FACIAL SWELLING: 0
EYE ITCHING: 0
SHORTNESS OF BREATH: 0
BLOOD IN STOOL: 0
CHILLS: 0
EYE DISCHARGE: 0
DIARRHEA: 0
DYSURIA: 0
FLANK PAIN: 0
JOINT SWELLING: 0
NECK STIFFNESS: 0
COUGH: 0
VOICE CHANGE: 0
CONSTIPATION: 0
OCCASIONAL FEELINGS OF UNSTEADINESS: 0
TROUBLE SWALLOWING: 0
ACTIVITY CHANGE: 0
PALPITATIONS: 0
ARTHRALGIAS: 0
UNEXPECTED WEIGHT CHANGE: 0
NERVOUS/ANXIOUS: 0
VOMITING: 0
ADENOPATHY: 0
WHEEZING: 0
EYE REDNESS: 0
DEPRESSION: 0
DIAPHORESIS: 0
SINUS PAIN: 0
FATIGUE: 0
WOUND: 0
ABDOMINAL PAIN: 0
SINUS PRESSURE: 0
BACK PAIN: 1
BRUISES/BLEEDS EASILY: 0
DIZZINESS: 0
FREQUENCY: 0
SLEEP DISTURBANCE: 0
SORE THROAT: 0
CHEST TIGHTNESS: 0
AGITATION: 0

## 2023-07-03 ASSESSMENT — PATIENT HEALTH QUESTIONNAIRE - PHQ9
1. LITTLE INTEREST OR PLEASURE IN DOING THINGS: NOT AT ALL
2. FEELING DOWN, DEPRESSED OR HOPELESS: NOT AT ALL
SUM OF ALL RESPONSES TO PHQ9 QUESTIONS 1 AND 2: 0

## 2023-07-03 NOTE — PROGRESS NOTES
"Subjective   Patient ID: Grecia Pineda is a 68 y.o. female who presents for 3 month follow up (Has been holding atorvastatin due to leg cramps).  Today she is accompanied by alone.     HPI  Subjective:   Grecia Pineda is a 68 y.o. female with hypertension.  Current Outpatient Medications   Medication Sig Dispense Refill    amLODIPine (Norvasc) 10 mg tablet Take 1 tablet (10 mg) by mouth once daily. 90 tablet 1    ascorbic acid, vitamin C, 500 mg capsule Take 1 capsule by mouth in the morning.      aspirin 81 mg EC tablet Take 1 tablet (81 mg) by mouth. Take every Mon, Wed & Fri      cholecalciferol (Vitamin D-3) 125 MCG (5000 UT) capsule Take 1 capsule (125 mcg) by mouth once daily.      cyanocobalamin (Vitamin B-12) 1,000 mcg tablet Take by mouth.      ferrous gluconate 324 (38 Fe) mg tablet Take 1 tablet (324 mg) by mouth once daily.      lisinopril 20 mg tablet TAKE 1 TABLET BY MOUTH EVERY DAY 90 tablet 0    magnesium oxide 500 mg tablet Take 1 tablet (500 mg) by mouth once daily.      metFORMIN XR (Glucophage-XR) 500 mg 24 hr tablet Take 1 tablet (500 mg) by mouth once daily. 90 tablet 1    vitamin E acetate (VITAMIN E ORAL) Take by mouth.      atorvastatin (Lipitor) 40 mg tablet Take 1 tablet (40 mg) by mouth once daily. 90 tablet 1     No current facility-administered medications for this visit.      Hypertension ROS: taking medications as instructed, no medication side effects noted, no TIA's, no chest pain on exertion, no dyspnea on exertion, no swelling of ankles, and no palpitations.   New concerns: none.     Objective:   Blood Pressure 112/74   Pulse 76   Temperature 36.2 °C (97.1 °F)   Respiration 16   Height 1.626 m (5' 4\")   Weight 72.1 kg (159 lb)   Oxygen Saturation 97%   Body Mass Index 27.29 kg/m²      Review of Systems   Constitutional:  Negative for activity change, appetite change, chills, diaphoresis, fatigue, fever and unexpected weight change.   HENT:  Negative for congestion, dental " "problem, drooling, ear discharge, ear pain, facial swelling, hearing loss, nosebleeds, postnasal drip, rhinorrhea, sinus pressure, sinus pain, sneezing, sore throat, tinnitus, trouble swallowing and voice change.    Eyes:  Negative for pain, discharge, redness, itching and visual disturbance.   Respiratory:  Negative for cough, chest tightness, shortness of breath and wheezing.    Cardiovascular:  Negative for chest pain, palpitations and leg swelling.   Gastrointestinal:  Negative for abdominal pain, blood in stool, constipation, diarrhea, nausea and vomiting.   Endocrine: Negative for cold intolerance, heat intolerance, polydipsia, polyphagia and polyuria.   Genitourinary:  Negative for decreased urine volume, dysuria, flank pain, frequency, hematuria and urgency.   Musculoskeletal:  Positive for back pain and myalgias. Negative for arthralgias, gait problem, joint swelling and neck stiffness.   Skin:  Negative for color change, pallor, rash and wound.   Neurological:  Negative for dizziness.   Hematological:  Negative for adenopathy. Does not bruise/bleed easily.   Psychiatric/Behavioral:  Negative for agitation, behavioral problems and sleep disturbance. The patient is not nervous/anxious.        Objective   Blood Pressure 112/74   Pulse 76   Temperature 36.2 °C (97.1 °F)   Respiration 16   Height 1.626 m (5' 4\")   Weight 72.1 kg (159 lb)   Oxygen Saturation 97%   Body Mass Index 27.29 kg/m²   BSA: 1.8 meters squared  Growth percentiles: Facility age limit for growth %kimberlyn is 20 years. Facility age limit for growth %kimberlyn is 20 years.     Physical Exam  Vitals and nursing note reviewed.   Constitutional:       Appearance: Normal appearance.   HENT:      Head: Normocephalic.      Right Ear: Tympanic membrane normal.      Left Ear: Tympanic membrane normal.   Cardiovascular:      Rate and Rhythm: Normal rate and regular rhythm.      Pulses: Normal pulses.      Heart sounds: Normal heart sounds.   Abdominal: "      General: Abdomen is flat. Bowel sounds are normal.   Musculoskeletal:         General: Normal range of motion.   Neurological:      Mental Status: She is alert.   Psychiatric:         Mood and Affect: Mood normal.         Behavior: Behavior normal.         Assessment/Plan   Problem List Items Addressed This Visit       Iron deficiency anemia secondary to inadequate dietary iron intake (Chronic)     Seeing hematology and is stable on Ferrous Sulphate once every other day         Relevant Orders    Folate    Iron and TIBC    Ferritin    Benign hypertension    Relevant Orders    Comprehensive Metabolic Panel    Magnesium    Hyperlipidemia    Relevant Orders    TSH with reflex to Free T4 if abnormal    Hyperlipidemia associated with type 2 diabetes mellitus (CMS/HCC)    Relevant Medications    atorvastatin (Lipitor) 40 mg tablet    Stage 3a chronic kidney disease    Type 2 diabetes mellitus with stage 3a chronic kidney disease, without long-term current use of insulin (CMS/[facility]) - Primary    Relevant Orders    Hemoglobin A1C    Chronic bilateral low back pain with bilateral sciatica     Other Visit Diagnoses       Vitamin B 12 deficiency        Relevant Orders    Vitamin B12    Bilateral leg cramps        Relevant Orders    Vitamin B12    TSH with reflex to Free T4 if abnormal    CBC and Auto Differential    Comprehensive Metabolic Panel    Uric Acid    CK    Sedimentation Rate    C-Reactive Protein    Multiple joint pain        Relevant Orders    Rheumatoid Factor          Current Outpatient Medications   Medication Sig Dispense Refill    amLODIPine (Norvasc) 10 mg tablet Take 1 tablet (10 mg) by mouth once daily. 90 tablet 1    ascorbic acid, vitamin C, 500 mg capsule Take 1 capsule by mouth in the morning.      aspirin 81 mg EC tablet Take 1 tablet (81 mg) by mouth. Take every Mon, Wed & Fri      cholecalciferol (Vitamin D-3) 125 MCG (5000 UT) capsule Take 1 capsule (125 mcg) by mouth once daily.       cyanocobalamin (Vitamin B-12) 1,000 mcg tablet Take by mouth.      ferrous gluconate 324 (38 Fe) mg tablet Take 1 tablet (324 mg) by mouth once daily.      lisinopril 20 mg tablet TAKE 1 TABLET BY MOUTH EVERY DAY 90 tablet 0    magnesium oxide 500 mg tablet Take 1 tablet (500 mg) by mouth once daily.      metFORMIN XR (Glucophage-XR) 500 mg 24 hr tablet Take 1 tablet (500 mg) by mouth once daily. 90 tablet 1    vitamin E acetate (VITAMIN E ORAL) Take by mouth.      atorvastatin (Lipitor) 40 mg tablet Take 1 tablet (40 mg) by mouth once daily. 90 tablet 1     No current facility-administered medications for this visit.     Dear Ms. Pineda:     To help you more effectively manage your high blood pressure, we would like to remind you of the following preventive measures you can take at home:    1) Eat right: Your diet should be rich in fruits, vegetables, potassium, and low-fat dairy products. You should also reduce your intake of sodium and fats, particularly saturated fats.    2) Maintain a healthy weight: Try to achieve and maintain a healthy weight. If you are unsure what this means for you, please contact our clinic.    3) Exercise: Try to get at least 30 minutes of aerobic exercise every day.    4) Moderate your alcohol consumption: Limit your alcohol intake to one drink per day.    5) Monitor your blood pressure: You should check your blood pressure regularly. Notify our clinic if your blood pressure readings are consistently higher than recommended.    We have included a personalized hypertension report card on the following page that lists your most recent blood pressure readings and lab results, along with your ideal values. If you have any questions or concerns about these instructions, your results, or your improving health, please don't hesitate to call.    Thank you for including us as members of your health care team.    [unfilled]    Sincerely,         Melva Cobb MD    Enclosure      Hypertension  Report Card for Grecia Pineda  July 3, 2023:     Below is a summary of recent tests related to your hypertension that can help you manage your health.     Blood Pressure:   Normal blood pressure values are 120/80 or lower. Your blood pressure values should be less than 140/90. If your readings at home are consistently higher than this, please contact me.     Your most recent blood pressure readings at our clinic were:   BP Readings from Last 3 Encounters:   07/03/23 112/74   04/03/23 124/70   11/21/22 128/80       Creatinine:   High blood pressure can cause a loss of kidney function. Creatinine is a measure of this kidney function.      Your most recent creatinine levels were:   Creatinine (mg/dL)   Date Value   04/01/2023 1.00   11/21/2022 1.04   07/23/2022 1.11 (H)           Potassium:  Potassium is an electrolyte in your blood that can be affected by blood pressure treatment.     Your most recent potassium levels were:  Potassium (mmol/L)   Date Value   04/01/2023 4.3   11/21/2022 4.3   07/23/2022 4.5     Work on low back exercises  Resume lower dose of atorvastatin  F/U in 3 months   May need PT

## 2023-07-03 NOTE — PATIENT INSTRUCTIONS
Current Outpatient Medications   Medication Sig Dispense Refill    amLODIPine (Norvasc) 10 mg tablet Take 1 tablet (10 mg) by mouth once daily. 90 tablet 1    ascorbic acid, vitamin C, 500 mg capsule Take 1 capsule by mouth in the morning.      aspirin 81 mg EC tablet Take 1 tablet (81 mg) by mouth. Take every Mon, Wed & Fri      cholecalciferol (Vitamin D-3) 125 MCG (5000 UT) capsule Take 1 capsule (125 mcg) by mouth once daily.      cyanocobalamin (Vitamin B-12) 1,000 mcg tablet Take by mouth.      ferrous gluconate 324 (38 Fe) mg tablet Take 1 tablet (324 mg) by mouth once daily.      lisinopril 20 mg tablet TAKE 1 TABLET BY MOUTH EVERY DAY 90 tablet 0    magnesium oxide 500 mg tablet Take 1 tablet (500 mg) by mouth once daily.      metFORMIN XR (Glucophage-XR) 500 mg 24 hr tablet Take 1 tablet (500 mg) by mouth once daily. 90 tablet 1    vitamin E acetate (VITAMIN E ORAL) Take by mouth.      atorvastatin (Lipitor) 40 mg tablet Take 1 tablet (40 mg) by mouth once daily. 90 tablet 1     No current facility-administered medications for this visit.   Dear Ms. Pineda:     To help you more effectively manage your high blood pressure, we would like to remind you of the following preventive measures you can take at home:    1) Eat right: Your diet should be rich in fruits, vegetables, potassium, and low-fat dairy products. You should also reduce your intake of sodium and fats, particularly saturated fats.    2) Maintain a healthy weight: Try to achieve and maintain a healthy weight. If you are unsure what this means for you, please contact our clinic.    3) Exercise: Try to get at least 30 minutes of aerobic exercise every day.    4) Moderate your alcohol consumption: Limit your alcohol intake to one drink per day.    5) Monitor your blood pressure: You should check your blood pressure regularly. Notify our clinic if your blood pressure readings are consistently higher than recommended.    We have included a personalized  hypertension report card on the following page that lists your most recent blood pressure readings and lab results, along with your ideal values. If you have any questions or concerns about these instructions, your results, or your improving health, please don't hesitate to call.    Thank you for including us as members of your health care team.    [unfilled]    Sincerely,         Melva Cobb MD    Enclosure      Hypertension Report Card for Grecia Pineda  July 3, 2023:     Below is a summary of recent tests related to your hypertension that can help you manage your health.     Blood Pressure:   Normal blood pressure values are 120/80 or lower. Your blood pressure values should be less than 140/90. If your readings at home are consistently higher than this, please contact me.     Your most recent blood pressure readings at our clinic were:   BP Readings from Last 3 Encounters:   07/03/23 112/74   04/03/23 124/70   11/21/22 128/80       Creatinine:   High blood pressure can cause a loss of kidney function. Creatinine is a measure of this kidney function.      Your most recent creatinine levels were:   Creatinine (mg/dL)   Date Value   04/01/2023 1.00   11/21/2022 1.04   07/23/2022 1.11 (H)           Potassium:  Potassium is an electrolyte in your blood that can be affected by blood pressure treatment.     Your most recent potassium levels were:  Potassium (mmol/L)   Date Value   04/01/2023 4.3   11/21/2022 4.3   07/23/2022 4.5     Work on low back exercises  Resume lower dose of atorvastatin  F/U in 3 months   May need PT

## 2023-07-05 NOTE — RESULT ENCOUNTER NOTE
Kidney function is elevated. Would like her to push fluids and recheck BMP in 1 week. WBC count is low and would like to recheck a CBC with diff in 1 week also  Other labs are normal

## 2023-07-07 DIAGNOSIS — D72.9 ABNORMAL WHITE BLOOD CELL COUNT: ICD-10-CM

## 2023-07-07 DIAGNOSIS — N18.31 STAGE 3A CHRONIC KIDNEY DISEASE (MULTI): ICD-10-CM

## 2023-07-07 DIAGNOSIS — Z00.00 ENCOUNTER FOR ANNUAL GENERAL MEDICAL EXAMINATION WITHOUT ABNORMAL FINDINGS IN ADULT: ICD-10-CM

## 2023-07-19 ENCOUNTER — LAB (OUTPATIENT)
Dept: LAB | Facility: LAB | Age: 68
End: 2023-07-19
Payer: MEDICARE

## 2023-07-19 DIAGNOSIS — R94.4 ABNORMAL RENAL FUNCTION TEST: ICD-10-CM

## 2023-07-19 DIAGNOSIS — D72.9 ABNORMAL WHITE BLOOD CELL COUNT: ICD-10-CM

## 2023-07-19 DIAGNOSIS — N18.31 STAGE 3A CHRONIC KIDNEY DISEASE (MULTI): ICD-10-CM

## 2023-07-19 LAB
ANION GAP IN SER/PLAS: 15 MMOL/L (ref 10–20)
BASOPHILS (10*3/UL) IN BLOOD BY AUTOMATED COUNT: 0.07 X10E9/L (ref 0–0.1)
BASOPHILS/100 LEUKOCYTES IN BLOOD BY AUTOMATED COUNT: 1.2 % (ref 0–2)
CALCIUM (MG/DL) IN SER/PLAS: 9.6 MG/DL (ref 8.6–10.6)
CARBON DIOXIDE, TOTAL (MMOL/L) IN SER/PLAS: 27 MMOL/L (ref 21–32)
CHLORIDE (MMOL/L) IN SER/PLAS: 100 MMOL/L (ref 98–107)
CREATININE (MG/DL) IN SER/PLAS: 1.22 MG/DL (ref 0.5–1.05)
EOSINOPHILS (10*3/UL) IN BLOOD BY AUTOMATED COUNT: 0.27 X10E9/L (ref 0–0.7)
EOSINOPHILS/100 LEUKOCYTES IN BLOOD BY AUTOMATED COUNT: 4.7 % (ref 0–6)
ERYTHROCYTE DISTRIBUTION WIDTH (RATIO) BY AUTOMATED COUNT: 14.3 % (ref 11.5–14.5)
ERYTHROCYTE MEAN CORPUSCULAR HEMOGLOBIN CONCENTRATION (G/DL) BY AUTOMATED: 33.3 G/DL (ref 32–36)
ERYTHROCYTE MEAN CORPUSCULAR VOLUME (FL) BY AUTOMATED COUNT: 89 FL (ref 80–100)
ERYTHROCYTES (10*6/UL) IN BLOOD BY AUTOMATED COUNT: 4.06 X10E12/L (ref 4–5.2)
GFR FEMALE: 48 ML/MIN/1.73M2
GLUCOSE (MG/DL) IN SER/PLAS: 92 MG/DL (ref 74–99)
HEMATOCRIT (%) IN BLOOD BY AUTOMATED COUNT: 36.3 % (ref 36–46)
HEMOGLOBIN (G/DL) IN BLOOD: 12.1 G/DL (ref 12–16)
IMMATURE GRANULOCYTES/100 LEUKOCYTES IN BLOOD BY AUTOMATED COUNT: 0.2 % (ref 0–0.9)
LEUKOCYTES (10*3/UL) IN BLOOD BY AUTOMATED COUNT: 5.7 X10E9/L (ref 4.4–11.3)
LYMPHOCYTES (10*3/UL) IN BLOOD BY AUTOMATED COUNT: 1.61 X10E9/L (ref 1.2–4.8)
LYMPHOCYTES/100 LEUKOCYTES IN BLOOD BY AUTOMATED COUNT: 28.3 % (ref 13–44)
MONOCYTES (10*3/UL) IN BLOOD BY AUTOMATED COUNT: 0.65 X10E9/L (ref 0.1–1)
MONOCYTES/100 LEUKOCYTES IN BLOOD BY AUTOMATED COUNT: 11.4 % (ref 2–10)
NEUTROPHILS (10*3/UL) IN BLOOD BY AUTOMATED COUNT: 3.08 X10E9/L (ref 1.2–7.7)
NEUTROPHILS/100 LEUKOCYTES IN BLOOD BY AUTOMATED COUNT: 54.2 % (ref 40–80)
NRBC (PER 100 WBCS) BY AUTOMATED COUNT: 0 /100 WBC (ref 0–0)
PLATELETS (10*3/UL) IN BLOOD AUTOMATED COUNT: 228 X10E9/L (ref 150–450)
POTASSIUM (MMOL/L) IN SER/PLAS: 4.5 MMOL/L (ref 3.5–5.3)
SODIUM (MMOL/L) IN SER/PLAS: 137 MMOL/L (ref 136–145)
UREA NITROGEN (MG/DL) IN SER/PLAS: 20 MG/DL (ref 6–23)

## 2023-07-19 PROCEDURE — 85025 COMPLETE CBC W/AUTO DIFF WBC: CPT

## 2023-07-19 PROCEDURE — 36415 COLL VENOUS BLD VENIPUNCTURE: CPT

## 2023-07-19 PROCEDURE — 80048 BASIC METABOLIC PNL TOTAL CA: CPT

## 2023-10-02 ENCOUNTER — LAB (OUTPATIENT)
Dept: LAB | Facility: LAB | Age: 68
End: 2023-10-02
Payer: MEDICARE

## 2023-10-02 DIAGNOSIS — N18.31 TYPE 2 DIABETES MELLITUS WITH STAGE 3A CHRONIC KIDNEY DISEASE, WITHOUT LONG-TERM CURRENT USE OF INSULIN (MULTI): ICD-10-CM

## 2023-10-02 DIAGNOSIS — E11.22 TYPE 2 DIABETES MELLITUS WITH STAGE 3A CHRONIC KIDNEY DISEASE, WITHOUT LONG-TERM CURRENT USE OF INSULIN (MULTI): ICD-10-CM

## 2023-10-02 LAB
ALBUMIN SERPL BCP-MCNC: 4.3 G/DL (ref 3.4–5)
ALP SERPL-CCNC: 45 U/L (ref 33–136)
ALT SERPL W P-5'-P-CCNC: 10 U/L (ref 7–45)
ANION GAP SERPL CALC-SCNC: 13 MMOL/L (ref 10–20)
AST SERPL W P-5'-P-CCNC: 13 U/L (ref 9–39)
BASOPHILS # BLD AUTO: 0.04 X10*3/UL (ref 0–0.1)
BASOPHILS NFR BLD AUTO: 1 %
BILIRUB SERPL-MCNC: 0.7 MG/DL (ref 0–1.2)
BUN SERPL-MCNC: 17 MG/DL (ref 6–23)
CALCIUM SERPL-MCNC: 9.8 MG/DL (ref 8.6–10.6)
CHLORIDE SERPL-SCNC: 104 MMOL/L (ref 98–107)
CHOLEST SERPL-MCNC: 157 MG/DL (ref 0–199)
CHOLESTEROL/HDL RATIO: 2.5
CO2 SERPL-SCNC: 28 MMOL/L (ref 21–32)
CREAT SERPL-MCNC: 1.04 MG/DL (ref 0.5–1.05)
EOSINOPHIL # BLD AUTO: 0.3 X10*3/UL (ref 0–0.7)
EOSINOPHIL NFR BLD AUTO: 7.6 %
ERYTHROCYTE [DISTWIDTH] IN BLOOD BY AUTOMATED COUNT: 14.3 % (ref 11.5–14.5)
GFR SERPL CREATININE-BSD FRML MDRD: 59 ML/MIN/1.73M*2
GLUCOSE SERPL-MCNC: 98 MG/DL (ref 74–99)
HCT VFR BLD AUTO: 36.5 % (ref 36–46)
HDLC SERPL-MCNC: 61.6 MG/DL
HGB BLD-MCNC: 11.7 G/DL (ref 12–16)
IMM GRANULOCYTES # BLD AUTO: 0 X10*3/UL (ref 0–0.7)
IMM GRANULOCYTES NFR BLD AUTO: 0 % (ref 0–0.9)
LDLC SERPL CALC-MCNC: 72 MG/DL (ref 140–190)
LYMPHOCYTES # BLD AUTO: 1.64 X10*3/UL (ref 1.2–4.8)
LYMPHOCYTES NFR BLD AUTO: 41.7 %
MCH RBC QN AUTO: 28.8 PG (ref 26–34)
MCHC RBC AUTO-ENTMCNC: 32.1 G/DL (ref 32–36)
MCV RBC AUTO: 90 FL (ref 80–100)
MONOCYTES # BLD AUTO: 0.5 X10*3/UL (ref 0.1–1)
MONOCYTES NFR BLD AUTO: 12.7 %
NEUTROPHILS # BLD AUTO: 1.45 X10*3/UL (ref 1.2–7.7)
NEUTROPHILS NFR BLD AUTO: 37 %
NON HDL CHOLESTEROL: 95 MG/DL (ref 0–149)
NRBC BLD-RTO: 0 /100 WBCS (ref 0–0)
PLATELET # BLD AUTO: 202 X10*3/UL (ref 150–450)
PMV BLD AUTO: 10.9 FL (ref 7.5–11.5)
POTASSIUM SERPL-SCNC: 4.4 MMOL/L (ref 3.5–5.3)
PROT SERPL-MCNC: 6.9 G/DL (ref 6.4–8.2)
RBC # BLD AUTO: 4.06 X10*6/UL (ref 4–5.2)
SODIUM SERPL-SCNC: 141 MMOL/L (ref 136–145)
TRIGL SERPL-MCNC: 115 MG/DL (ref 0–149)
VLDL: 23 MG/DL (ref 0–40)
WBC # BLD AUTO: 3.9 X10*3/UL (ref 4.4–11.3)

## 2023-10-02 PROCEDURE — 36415 COLL VENOUS BLD VENIPUNCTURE: CPT

## 2023-10-03 ENCOUNTER — OFFICE VISIT (OUTPATIENT)
Dept: PRIMARY CARE | Facility: CLINIC | Age: 68
End: 2023-10-03
Payer: MEDICARE

## 2023-10-03 ENCOUNTER — TELEPHONE (OUTPATIENT)
Dept: PRIMARY CARE | Facility: CLINIC | Age: 68
End: 2023-10-03

## 2023-10-03 VITALS
WEIGHT: 154 LBS | SYSTOLIC BLOOD PRESSURE: 108 MMHG | HEART RATE: 66 BPM | HEIGHT: 64 IN | RESPIRATION RATE: 16 BRPM | BODY MASS INDEX: 26.29 KG/M2 | DIASTOLIC BLOOD PRESSURE: 66 MMHG | TEMPERATURE: 97.6 F | OXYGEN SATURATION: 99 %

## 2023-10-03 DIAGNOSIS — N18.31 STAGE 3A CHRONIC KIDNEY DISEASE (MULTI): ICD-10-CM

## 2023-10-03 DIAGNOSIS — E11.22 TYPE 2 DIABETES MELLITUS WITH STAGE 3A CHRONIC KIDNEY DISEASE, WITHOUT LONG-TERM CURRENT USE OF INSULIN (MULTI): Primary | ICD-10-CM

## 2023-10-03 DIAGNOSIS — Z23 NEED FOR PNEUMOCOCCAL 20-VALENT CONJUGATE VACCINATION: ICD-10-CM

## 2023-10-03 DIAGNOSIS — N18.31 TYPE 2 DIABETES MELLITUS WITH STAGE 3A CHRONIC KIDNEY DISEASE, WITHOUT LONG-TERM CURRENT USE OF INSULIN (MULTI): Primary | ICD-10-CM

## 2023-10-03 DIAGNOSIS — I10 BENIGN HYPERTENSION: ICD-10-CM

## 2023-10-03 DIAGNOSIS — E78.2 MIXED HYPERLIPIDEMIA: ICD-10-CM

## 2023-10-03 DIAGNOSIS — E78.5 HYPERLIPIDEMIA ASSOCIATED WITH TYPE 2 DIABETES MELLITUS (MULTI): ICD-10-CM

## 2023-10-03 DIAGNOSIS — E11.69 HYPERLIPIDEMIA ASSOCIATED WITH TYPE 2 DIABETES MELLITUS (MULTI): ICD-10-CM

## 2023-10-03 DIAGNOSIS — D50.8 IRON DEFICIENCY ANEMIA SECONDARY TO INADEQUATE DIETARY IRON INTAKE: Chronic | ICD-10-CM

## 2023-10-03 PROBLEM — E55.9 VITAMIN D DEFICIENCY: Status: RESOLVED | Noted: 2023-01-19 | Resolved: 2023-10-03

## 2023-10-03 PROBLEM — M77.8 TENDONITIS OF WRIST, RIGHT: Status: RESOLVED | Noted: 2023-01-19 | Resolved: 2023-10-03

## 2023-10-03 LAB — POC HEMOGLOBIN A1C: 6 % (ref 4.2–6.5)

## 2023-10-03 PROCEDURE — 99214 OFFICE O/P EST MOD 30 MIN: CPT | Performed by: FAMILY MEDICINE

## 2023-10-03 PROCEDURE — G0009 ADMIN PNEUMOCOCCAL VACCINE: HCPCS | Performed by: FAMILY MEDICINE

## 2023-10-03 PROCEDURE — 1159F MED LIST DOCD IN RCRD: CPT | Performed by: FAMILY MEDICINE

## 2023-10-03 PROCEDURE — 1036F TOBACCO NON-USER: CPT | Performed by: FAMILY MEDICINE

## 2023-10-03 PROCEDURE — 3048F LDL-C <100 MG/DL: CPT | Performed by: FAMILY MEDICINE

## 2023-10-03 PROCEDURE — 3074F SYST BP LT 130 MM HG: CPT | Performed by: FAMILY MEDICINE

## 2023-10-03 PROCEDURE — 3078F DIAST BP <80 MM HG: CPT | Performed by: FAMILY MEDICINE

## 2023-10-03 PROCEDURE — 1160F RVW MEDS BY RX/DR IN RCRD: CPT | Performed by: FAMILY MEDICINE

## 2023-10-03 PROCEDURE — 90677 PCV20 VACCINE IM: CPT | Performed by: FAMILY MEDICINE

## 2023-10-03 PROCEDURE — 2028F FOOT EXAM PERFORMED: CPT | Performed by: FAMILY MEDICINE

## 2023-10-03 PROCEDURE — 4010F ACE/ARB THERAPY RXD/TAKEN: CPT | Performed by: FAMILY MEDICINE

## 2023-10-03 PROCEDURE — 3044F HG A1C LEVEL LT 7.0%: CPT | Performed by: FAMILY MEDICINE

## 2023-10-03 PROCEDURE — 83036 HEMOGLOBIN GLYCOSYLATED A1C: CPT | Performed by: FAMILY MEDICINE

## 2023-10-03 RX ORDER — METFORMIN HYDROCHLORIDE 500 MG/1
500 TABLET, EXTENDED RELEASE ORAL DAILY
Qty: 90 TABLET | Refills: 1 | Status: SHIPPED | OUTPATIENT
Start: 2023-10-03 | End: 2024-02-05 | Stop reason: SDUPTHER

## 2023-10-03 ASSESSMENT — PATIENT HEALTH QUESTIONNAIRE - PHQ9
2. FEELING DOWN, DEPRESSED OR HOPELESS: NOT AT ALL
SUM OF ALL RESPONSES TO PHQ9 QUESTIONS 1 AND 2: 0
1. LITTLE INTEREST OR PLEASURE IN DOING THINGS: NOT AT ALL
10. IF YOU CHECKED OFF ANY PROBLEMS, HOW DIFFICULT HAVE THESE PROBLEMS MADE IT FOR YOU TO DO YOUR WORK, TAKE CARE OF THINGS AT HOME, OR GET ALONG WITH OTHER PEOPLE: NOT DIFFICULT AT ALL

## 2023-10-03 ASSESSMENT — ENCOUNTER SYMPTOMS
BACK PAIN: 0
ARTHRALGIAS: 0
EYE DISCHARGE: 0
FACIAL SWELLING: 0
DIARRHEA: 0
DIZZINESS: 0
EYE REDNESS: 0
MYALGIAS: 0
WOUND: 0
SINUS PAIN: 0
VOICE CHANGE: 0
COLOR CHANGE: 0
JOINT SWELLING: 0
BLOOD IN STOOL: 0
BRUISES/BLEEDS EASILY: 0
WHEEZING: 0
POLYDIPSIA: 0
NECK STIFFNESS: 0
CONSTIPATION: 0
DYSURIA: 0
VOMITING: 0
DEPRESSION: 0
EYE PAIN: 0
APPETITE CHANGE: 0
FATIGUE: 0
OCCASIONAL FEELINGS OF UNSTEADINESS: 0
FEVER: 0
ADENOPATHY: 0
UNEXPECTED WEIGHT CHANGE: 0
ABDOMINAL PAIN: 0
FLANK PAIN: 0
CHEST TIGHTNESS: 0
LOSS OF SENSATION IN FEET: 0
COUGH: 0
FREQUENCY: 0
DIAPHORESIS: 0
RHINORRHEA: 0
SORE THROAT: 0
ACTIVITY CHANGE: 0
AGITATION: 0
NAUSEA: 0
TROUBLE SWALLOWING: 0
NERVOUS/ANXIOUS: 0
SLEEP DISTURBANCE: 0
SINUS PRESSURE: 0
SHORTNESS OF BREATH: 0
POLYPHAGIA: 0
EYE ITCHING: 0
PALPITATIONS: 0
CHILLS: 0
HEMATURIA: 0

## 2023-10-03 NOTE — ASSESSMENT & PLAN NOTE
Stable on current medications  Continue meds and exercise.   Medications have been sent to the pharmacy  Diabetes mellitus Type II, under good control.  Discussed general issues about diabetes pathophysiology and management.  Counseling at today's visit: discussed the need for weight loss, focused on the need for regular aerobic exercise, focused on the need to adhere to the prescribed ADA diet, discussed the advantages of a diet low in carbohydrates, and reminded to check sugars regularly and to bring readings in at the time of the next visit.  Educational material distributed.  Addressed ADA diet.  Suggested low cholesterol diet.  Encouraged aerobic exercise.

## 2023-10-03 NOTE — PATIENT INSTRUCTIONS
Pt advised to increase her iron pill supplement to 3 days/wk to address anemia.   Counseling given regarding vaccines - pneumococcal, RSV, influenza, COVID. Pneumococcal given in office today.  Will increase Mg intake from 300 to 500mg daily: 250mg in morning and evening.  Pt advised on F/U with ophthalmologist for diabetic retinopathy exam  Pt will F/U in 3-4 months for Medicare wellness visit.    Current Outpatient Medications:     amLODIPine (Norvasc) 10 mg tablet, Take 1 tablet (10 mg) by mouth once daily., Disp: 90 tablet, Rfl: 1    ascorbic acid, vitamin C, 500 mg capsule, Take 1 capsule by mouth in the morning., Disp: , Rfl:     aspirin 81 mg EC tablet, Take 1 tablet (81 mg) by mouth. Take every Mon, Wed & Fri, Disp: , Rfl:     atorvastatin (Lipitor) 40 mg tablet, Take 1 tablet (40 mg) by mouth once daily., Disp: 90 tablet, Rfl: 1    cholecalciferol (Vitamin D-3) 125 MCG (5000 UT) capsule, Take 1 capsule (125 mcg) by mouth once daily., Disp: , Rfl:     cyanocobalamin (Vitamin B-12) 1,000 mcg tablet, Take by mouth., Disp: , Rfl:     ferrous gluconate 324 (38 Fe) mg tablet, Take 1 tablet (324 mg) by mouth once daily., Disp: , Rfl:     lisinopril 20 mg tablet, TAKE 1 TABLET BY MOUTH EVERY DAY, Disp: 90 tablet, Rfl: 0    magnesium oxide 500 mg tablet, Take 1 tablet (500 mg) by mouth once daily., Disp: , Rfl:     vitamin E acetate (VITAMIN E ORAL), Take by mouth., Disp: , Rfl:     metFORMIN  mg 24 hr tablet, Take 1 tablet (500 mg) by mouth once daily., Disp: 90 tablet, Rfl: 1

## 2023-10-03 NOTE — ASSESSMENT & PLAN NOTE
Stable on current medications  Continue meds and exercise.   Medications have been sent to the pharmacy

## 2023-10-03 NOTE — PROGRESS NOTES
Subjective   Patient ID: Grecia Pineda is a 68 y.o. female who presents for Follow-up and Diabetes (Review blood work ).  Today she is accompanied by alone. Pt reports that she had cut down her iron pill supplement to 2 days/week. She is feeling great health-wise, and is participating in Weight Watchers and [employer] groups.    Pt has plans for kidney workup in the near future to address eGFR.     Diabetes  Pertinent negatives for hypoglycemia include no dizziness, nervousness/anxiousness or pallor. Pertinent negatives for diabetes include no chest pain, no fatigue, no polydipsia, no polyphagia and no polyuria.         Review of Systems   Constitutional:  Negative for activity change, appetite change, chills, diaphoresis, fatigue, fever and unexpected weight change.   HENT:  Negative for congestion, dental problem, drooling, ear discharge, ear pain, facial swelling, hearing loss, nosebleeds, postnasal drip, rhinorrhea, sinus pressure, sinus pain, sneezing, sore throat, tinnitus, trouble swallowing and voice change.    Eyes:  Negative for pain, discharge, redness, itching and visual disturbance.   Respiratory:  Negative for cough, chest tightness, shortness of breath and wheezing.    Cardiovascular:  Negative for chest pain, palpitations and leg swelling.   Gastrointestinal:  Negative for abdominal pain, blood in stool, constipation, diarrhea, nausea and vomiting.   Endocrine: Negative for cold intolerance, heat intolerance, polydipsia, polyphagia and polyuria.   Genitourinary:  Negative for decreased urine volume, dysuria, flank pain, frequency, hematuria and urgency.   Musculoskeletal:  Negative for arthralgias, back pain, gait problem, joint swelling, myalgias and neck stiffness.   Skin:  Negative for color change, pallor, rash and wound.   Neurological:  Negative for dizziness.   Hematological:  Negative for adenopathy. Does not bruise/bleed easily.   Psychiatric/Behavioral:  Negative for agitation,  "behavioral problems and sleep disturbance. The patient is not nervous/anxious.        Objective   Blood Pressure 108/66 (BP Location: Left arm, Patient Position: Sitting, BP Cuff Size: Adult)   Pulse 66   Temperature 36.4 °C (97.6 °F)   Respiration 16   Height 1.626 m (5' 4\")   Weight 69.9 kg (154 lb)   Last Menstrual Period 10/03/2008 (Approximate)   Oxygen Saturation 99%   Body Mass Index 26.43 kg/m²   BSA: 1.78 meters squared  Growth percentiles: Facility age limit for growth %kimberlyn is 20 years. Facility age limit for growth %kimberlyn is 20 years.     Physical Exam  Vitals and nursing note reviewed.   Constitutional:       General: She is not in acute distress.     Appearance: Normal appearance. She is normal weight. She is not ill-appearing.   HENT:      Head: Normocephalic.      Right Ear: Tympanic membrane, ear canal and external ear normal.      Left Ear: Tympanic membrane, ear canal and external ear normal.      Nose: Nose normal. No rhinorrhea.      Mouth/Throat:      Mouth: Mucous membranes are moist.      Pharynx: Oropharynx is clear.   Eyes:      Extraocular Movements: Extraocular movements intact.      Conjunctiva/sclera: Conjunctivae normal.      Pupils: Pupils are equal, round, and reactive to light.   Cardiovascular:      Rate and Rhythm: Normal rate and regular rhythm.      Pulses: Normal pulses.      Heart sounds: Normal heart sounds.   Pulmonary:      Effort: Pulmonary effort is normal. No respiratory distress.      Breath sounds: Normal breath sounds. No wheezing.   Abdominal:      General: Abdomen is flat. Bowel sounds are normal.      Palpations: Abdomen is soft.      Tenderness: There is no abdominal tenderness. There is no guarding or rebound.      Hernia: No hernia is present.   Musculoskeletal:         General: Normal range of motion.      Cervical back: Normal range of motion and neck supple. No rigidity or tenderness.      Right lower leg: No edema.      Left lower leg: No edema. "   Lymphadenopathy:      Cervical: No cervical adenopathy.   Skin:     General: Skin is warm and dry.      Capillary Refill: Capillary refill takes more than 3 seconds.   Neurological:      General: No focal deficit present.      Mental Status: She is alert and oriented to person, place, and time.      Sensory: No sensory deficit.      Motor: No weakness.      Coordination: Coordination normal.   Psychiatric:         Mood and Affect: Mood normal.         Behavior: Behavior normal.         Thought Content: Thought content normal.         Judgment: Judgment normal.         Assessment/Plan   Problem List Items Addressed This Visit             ICD-10-CM    Iron deficiency anemia secondary to inadequate dietary iron intake (Chronic) D50.8     Seeing hematology and is stable on Ferrous Sulphate once every other day         Benign hypertension I10     Stable on current medications  Continue meds and exercise.   Medications have been sent to the pharmacy           RESOLVED: Hyperlipidemia E78.5    Hyperlipidemia associated with type 2 diabetes mellitus (CMS/[facility]) E11.69, E78.5     Stable on current medications  Continue meds and exercise.   Medications have been sent to the pharmacy           Stage 3a chronic kidney disease (CMS/[facility]) N18.31     Seeing nephrology  Stable         Type 2 diabetes mellitus with stage 3a chronic kidney disease, without long-term current use of insulin (CMS/[facility]) - Primary E11.22, N18.31     Stable on current medications  Continue meds and exercise.   Medications have been sent to the pharmacy  Diabetes mellitus Type II, under good control.  Discussed general issues about diabetes pathophysiology and management.  Counseling at today's visit: discussed the need for weight loss, focused on the need for regular aerobic exercise, focused on the need to adhere to the prescribed ADA diet, discussed the advantages of a diet low in carbohydrates, and reminded to check sugars regularly and to bring readings  in at the time of the next visit.  Educational material distributed.  Addressed ADA diet.  Suggested low cholesterol diet.  Encouraged aerobic exercise.           Relevant Medications    metFORMIN  mg 24 hr tablet    Other Relevant Orders    POCT glycosylated hemoglobin (Hb A1C) manually resulted (Completed)     Other Visit Diagnoses       Diagnosis Codes    Need for pneumococcal 20-valent conjugate vaccination     Z23    Relevant Orders    Pneumococcal conjugate vaccine, 20-valent (PREVNAR 20)        Pt advised to increase her iron pill supplement to 3 days/wk to address anemia.   Counseling given regarding vaccines - pneumococcal, RSV, influenza, COVID. Pneumococcal given in office today.  Will increase Mg intake from 300 to 500mg daily: 250mg in morning and evening.  Pt advised on F/U with ophthalmologist for diabetic retinopathy exam  Pt will F/U in 3-4 months for Medicare wellness visit.

## 2023-10-10 DIAGNOSIS — E78.5 HYPERLIPIDEMIA ASSOCIATED WITH TYPE 2 DIABETES MELLITUS (MULTI): ICD-10-CM

## 2023-10-10 DIAGNOSIS — E11.69 HYPERLIPIDEMIA ASSOCIATED WITH TYPE 2 DIABETES MELLITUS (MULTI): ICD-10-CM

## 2023-10-10 DIAGNOSIS — E53.8 VITAMIN B 12 DEFICIENCY: ICD-10-CM

## 2023-10-10 DIAGNOSIS — N95.1 POST MENOPAUSAL SYNDROME: ICD-10-CM

## 2023-10-10 DIAGNOSIS — E55.9 VITAMIN D DEFICIENCY: ICD-10-CM

## 2023-10-10 DIAGNOSIS — Z00.00 ENCOUNTER FOR ANNUAL GENERAL MEDICAL EXAMINATION WITHOUT ABNORMAL FINDINGS IN ADULT: ICD-10-CM

## 2023-10-10 DIAGNOSIS — E11.22 TYPE 2 DIABETES MELLITUS WITH STAGE 3A CHRONIC KIDNEY DISEASE, WITHOUT LONG-TERM CURRENT USE OF INSULIN (MULTI): ICD-10-CM

## 2023-10-10 DIAGNOSIS — N18.31 TYPE 2 DIABETES MELLITUS WITH STAGE 3A CHRONIC KIDNEY DISEASE, WITHOUT LONG-TERM CURRENT USE OF INSULIN (MULTI): ICD-10-CM

## 2023-10-10 DIAGNOSIS — D50.8 IRON DEFICIENCY ANEMIA SECONDARY TO INADEQUATE DIETARY IRON INTAKE: Chronic | ICD-10-CM

## 2023-11-01 ENCOUNTER — OFFICE VISIT (OUTPATIENT)
Dept: HEMATOLOGY/ONCOLOGY | Facility: CLINIC | Age: 68
End: 2023-11-01
Payer: MEDICARE

## 2023-11-01 VITALS
HEART RATE: 70 BPM | HEIGHT: 63 IN | TEMPERATURE: 96.6 F | WEIGHT: 156.31 LBS | OXYGEN SATURATION: 99 % | SYSTOLIC BLOOD PRESSURE: 116 MMHG | DIASTOLIC BLOOD PRESSURE: 69 MMHG | BODY MASS INDEX: 27.7 KG/M2 | RESPIRATION RATE: 14 BRPM

## 2023-11-01 DIAGNOSIS — D50.9 IRON DEFICIENCY ANEMIA, UNSPECIFIED IRON DEFICIENCY ANEMIA TYPE: Primary | ICD-10-CM

## 2023-11-01 PROCEDURE — 99214 OFFICE O/P EST MOD 30 MIN: CPT | Performed by: NURSE PRACTITIONER

## 2023-11-01 PROCEDURE — 1160F RVW MEDS BY RX/DR IN RCRD: CPT | Performed by: NURSE PRACTITIONER

## 2023-11-01 PROCEDURE — 1159F MED LIST DOCD IN RCRD: CPT | Performed by: NURSE PRACTITIONER

## 2023-11-01 PROCEDURE — 3074F SYST BP LT 130 MM HG: CPT | Performed by: NURSE PRACTITIONER

## 2023-11-01 PROCEDURE — 4010F ACE/ARB THERAPY RXD/TAKEN: CPT | Performed by: NURSE PRACTITIONER

## 2023-11-01 PROCEDURE — 2028F FOOT EXAM PERFORMED: CPT | Performed by: NURSE PRACTITIONER

## 2023-11-01 PROCEDURE — 3078F DIAST BP <80 MM HG: CPT | Performed by: NURSE PRACTITIONER

## 2023-11-01 PROCEDURE — 3048F LDL-C <100 MG/DL: CPT | Performed by: NURSE PRACTITIONER

## 2023-11-01 PROCEDURE — 1036F TOBACCO NON-USER: CPT | Performed by: NURSE PRACTITIONER

## 2023-11-01 PROCEDURE — 1126F AMNT PAIN NOTED NONE PRSNT: CPT | Performed by: NURSE PRACTITIONER

## 2023-11-01 PROCEDURE — 3044F HG A1C LEVEL LT 7.0%: CPT | Performed by: NURSE PRACTITIONER

## 2023-11-01 ASSESSMENT — PAIN SCALES - GENERAL: PAINLEVEL: 0-NO PAIN

## 2023-11-01 NOTE — PROGRESS NOTES
Chief Complaint: anemia   Interval History:    Location: [facility]  Initial consult: 22  Reason: anemia  DX: ROSIE  TX: oral iron w/ vit c     Patient is a 68 yo female with a PMH of DMII, HTN, Fatty liver, HLP, CKD  and was referred to benign hematology for consultation of anemia. Referred by Dr. Cobb.     Today, patient presents for followup. No new issues - feeling well. Taking oral iron 3 days a week with vit c w/o issue.      Denies abnormal weight loss, night sweats, fever. Denies fatigue, chills, sob, cp, n/v/d, n/t. No PICA. Denies any abnormal bleeding or bruising. No recurrent infections or lymphadenopathy. No bone pain.      Colonoscopy 19 NL  Menses - no hx menorrhagia   Diet - no red meat  UTD Cancer screenings     SH: Tobacco - no, ETOH - rare, Rec drug use - no . Radiation exposure - no. G 2 P 3  PSH: , foot, jaw  FH: anemia all female side of family ([place] - thalassemia), heart valve   Meds: see list and vit c tab daily     Review of Systems:   Review of Systems:    10 point review of systems negative except as state in HPI.         Allergies and Intolerances:       Allergies:         codeine: Drug, Rash, Hives/Urticaria, Itching, Active     Outpatient Medication Profile:   * Patient Currently Takes Medications as of 2023 09:48 documented in Structured Notes         ferrous gluconate 324 mg (38 mg elemental  iron) oral tablet: Last Dose Taken:  , 1 tab(s) orally every other day , Start Date: 2023         amLODIPine 10 mg oral tablet: Last Dose Taken:           lisinopril 20 mg oral tablet: Last Dose Taken:  , 1 tab(s) orally once  a day         atorvastatin 80 mg oral tablet: Last Dose Taken:  , 1 tab(s) orally once  a day             Medical History:         Anemia: ICD-10: D64.9, Status: Active     Family History: No Family History items are recorded  in the problem list.       Social History:   Social Substance History:  ·  Smoking Status never smoker (1)             Vitals and Measurements:   Vitals: Temp: 36.9  HR: 81  RR: 18  BP: 131/79  SPO2%:   100   Measurements: HT(cm): 161.2  WT(kg): 79.3  BSA: 1.88   BMI:  30.5      Physical Exam:      Constitutional: Well developed, awake/alert/oriented  x3, no distress, alert and cooperative   Eyes: clear sclera   ENMT: mucous membranes moist   Head/Neck: Neck supple, no apparent injury, trachea  midline   Respiratory/Thorax: Patent airways, CTAB, normal  breath sounds with good chest expansion, thorax symmetric   Cardiovascular: Regular, rate and rhythm, no murmurs,  normal S 1and S 2   Gastrointestinal: Abdomen soft nontender, nondistended,  + bowel sounds.   No organomegaly   Musculoskeletal: ROM intact, no joint swelling, normal  strength   Extremities: normal extremities, no cyanosis, no  clubbing, no edema   Neurological: Patient is alert and oriented x3. Nonfocal  exam. No myoclonus   Lymphatic: No significant lymphadenopathy   Psychological: Pleasant, appropriate, and easily  engaged   Skin: Warm and dry, no lesions, no rashes         Lab Results:     ·  Results        CBC date/time       WBC     HGB     HCT     PLT     Neut      21-Nov-2022 08:51   4.6     12.3    37.0    208     1.91  23-Jul-2022 11:07   4.7     12.2    36.9    211     2.31  14-Apr-2022 11:08   5.4     12.4    37.8    230     3.07  01-Mar-2022 10:46   5.4     11.3(L) 35.6(L) 245     2.29  24-May-2021 08:01   4.8     11.4(L) 34.5(L) 210     1.65  25-Feb-2021 07:23   5.4     11.7(L) 37.0    213     1.86     BMP date/time       NA              K               CL              CO2             BUN             CREAT             21-Nov-2022 08:51   N/A             N/A             N/A             N/A             N/A             N/A  21-Nov-2022 08:51   141             4.3             104             N/A             19              1.04  23-Jul-2022 11:07   N/A             N/A             N/A             N/A             N/A             N/A  23-Jul-2022 11:07    134(L)          4.5             100             N/A             21               1.11(H)  01-Mar-2022 10:46   139             4.4             102             N/A             17              0.92  24-May-2021 08:01   138             4.4             102             N/A             18              1.02  25-Feb-2021 07:23   139             4.2             104             N/A             17              1.11(H)     LDH date/time       LDH     21-Nov-2022 08:51   N/A  21-Nov-2022 08:51   N/A  23-Jul-2022 11:07   N/A  23-Jul-2022 11:07   N/A  14-Apr-2022 11:06   127        I have reviewed these laboratory results:     Complete Blood Count + Differential  Trending View       Result 01-Feb-2023 09:31:00  21-Nov-2022 08:51:00    White Blood Cell Count 3.6   L   4.6    Red Blood Cell Count 4.33   4.17    HGB 12.5   12.3    HCT 37.8   37.0    MCV 87   89    MCHC 33.1   33.2       208    RDW-CV 14.4   15.2   H    Neutrophil % 35.1   41.3    Immature Granulocytes % 0.0   0.2    Lymphocyte % 43.3   38.2    Monocyte % 13.9   11.7    Eosinophil % 5.8   7.1    Basophil % 1.9   1.5    Neutrophil Count 1.26   1.91    Lymphocyte Count 1.56   1.77    Monocyte Count 0.50   0.54    Eosinophil Count 0.21   0.33    Basophil Count 0.07   0.07    Nucleated Erythrocyte Count    0.4        Albumin, Urine Spot  21-Nov-2022 08:51:00       Result Value    Albumin Urine  <7.0    Reference Range: Not Established    Albumin/Creat Ratio, Urine  SEE COMMENT One or more analytes used in this calculation   is outside of the analytical measurement range.  Calculation cannot be performed.    Creatinine, Urine Spot  69.6       Reticulocyte Count  21-Nov-2022 08:51:00       Result Value    Retic %  1.4    Retic #  0.058    Immature Retic Fraction  16.9   H   Retic-HB  33       Comprehensive Metabolic Panel  21-Nov-2022 08:51:00       Result Value    Glucose, Serum  120   H   NA  141    K  4.3    CL  104    Bicarbonate, Serum  28    Anion Gap, Serum  13     BUN  19    CREAT  1.04    GFR Female  59   A   Calcium, Serum  9.8    ALB  4.7    ALKP  53    T Pro  7.3    T Bili  0.8    Alanine Aminotransferase, Serum  11    Aspartate Transaminase, Serum  14       Hemoglobin A1C  21-Nov-2022 08:51:00       Result Value    Hemoglobin A1C, Level  6.3 Diagnosis of Diabetes-Adults   Non-Diabetic: < or = 5.6%   Increased risk for developing diabetes: 5.7-6.4%   Diagnostic of diabetes: > or = 6.5%  .       Monitoring of Diabetes                Age (y)     Therapeutic Goal (%)   Adults:          >1   A   Estimated Average Glucose  134       Iron + TIBC, Serum  21-Nov-2022 08:51:00       Result Value    Iron, Serum  121    Total Iron Binding Capacity  382    % Saturation  32       Vitamin D (25-Hydroxy), Level  21-Nov-2022 08:51:00       Result Value    Vitamin D (25-Hydroxy), Level  53       Magnesium, Serum  21-Nov-2022 08:51:00       Result Value    Magnesium, Serum  1.99       C Reactive Protein, Serum  21-Nov-2022 08:51:00       Result Value    C Reactive Protein, Serum  <0.10       Folate, Serum  21-Nov-2022 08:51:00       Result Value    Folate, Serum  20.0       Ferritin, Serum  21-Nov-2022 08:51:00       Result Value    Ferritin, Serum  62       Vitamin B12, Serum  21-Nov-2022 08:51:00       Result Value    Vitamin B12, Serum  722       Uric Acid, Serum  21-Nov-2022 08:51:00       Result Value    Uric Acid, Serum  6.0          Assessment and Plan:      Assessment and Plan:   Assessment:    Location: [facility]  Initial consult: 4/14/22  Reason: anemia  DX: ROSIE  TX: oral iron w/ vit c     Patient is a 68 yo female with a PMH of DMII, HTN, Fatty liver, HLP, CKD  and was referred to benign hematology for consultation of anemia. Referred by Dr. Cobb.     Today, patient presents for followup. I reviewed patient's chart including but not limited to labs, imaging, surgical/procedure notes, pathology, hospital notes, doctor's notes. Mild anemia since last year according to  records but note I do not have any  CBCs before this time. Pt reports she has had mild anemia for about 20yrs. Labs from last year indicate she has mild iron def. UTD Cancer screenings and no abnormal bruising/bleeding. On Metformin.   - Hematological workup did not reveal anything malicious. HgbID nl, hemolysis labs negative. Other blood lines normal. Path review and labs consistent with iron def (%sat 17, ferritin in 30s) - likely malabsorption. Started oral iron therapy and counts  have improved. Hgb recently 12.2 and ferritin increased to 40s and %sat 22.  Vit b12 levels continue to be elevated on Vit b12 supplementation 3 days a week so it was stopped      2/2023 - cbc normal. iron studies stable does not require as much oral iron - will decrease to 3 days a week for maintenance     11/3/23 - Most recent cbc stable - hgb 11.7. Last iron studies stable (mild iron def) from 7/2023 - ferritin 57, %sat 14. Will have pt continue oral iron (Ferrous gluconate (324mg PO tab with vit c) 3 days a week / every other day.  Will have PCP take over monitoring and treatment as she has been stable for over a year.    Does not need to continue to follow with me at this time but am more than happy to see the patient in the future if needed.     I had an extensive discussion with the patient regarding the diagnosis and discussed the plan of therapy, including general considerations regarding side effects and outcomes. Pt understood and gave appropriate teach back about the plan of care. All questions  were answered to the patient's satisfaction. The patient is instructed to contact us at any time if questions or problems arise. Thank you for the opportunity to participate in the care of this very pleasant patient.     Total time =30 minutes. 50% or more of this time was spent in counseling and/or coordination of care including reviewing medical history/radiology/labs, examining patient, formulating outlined plan with team, and  discussing plan with patient/family.

## 2023-11-01 NOTE — PROGRESS NOTES
Patient to follow-up with PCP.  Call back instructions reviewed.  Patient verbalized understanding.

## 2023-11-08 ENCOUNTER — LAB (OUTPATIENT)
Dept: LAB | Facility: LAB | Age: 68
End: 2023-11-08
Payer: MEDICARE

## 2023-11-08 ENCOUNTER — ANCILLARY PROCEDURE (OUTPATIENT)
Dept: RADIOLOGY | Facility: CLINIC | Age: 68
End: 2023-11-08
Payer: MEDICARE

## 2023-11-08 DIAGNOSIS — N18.9 CHRONIC KIDNEY DISEASE, UNSPECIFIED: Primary | ICD-10-CM

## 2023-11-08 DIAGNOSIS — N18.9 CHRONIC KIDNEY DISEASE, UNSPECIFIED: ICD-10-CM

## 2023-11-08 PROCEDURE — 85027 COMPLETE CBC AUTOMATED: CPT

## 2023-11-08 PROCEDURE — 81003 URINALYSIS AUTO W/O SCOPE: CPT

## 2023-11-08 PROCEDURE — 83970 ASSAY OF PARATHORMONE: CPT

## 2023-11-08 PROCEDURE — 76770 US EXAM ABDO BACK WALL COMP: CPT

## 2023-11-08 PROCEDURE — 36415 COLL VENOUS BLD VENIPUNCTURE: CPT

## 2023-11-08 PROCEDURE — 80069 RENAL FUNCTION PANEL: CPT

## 2023-11-08 PROCEDURE — 82306 VITAMIN D 25 HYDROXY: CPT

## 2023-11-08 PROCEDURE — 76770 US EXAM ABDO BACK WALL COMP: CPT | Performed by: STUDENT IN AN ORGANIZED HEALTH CARE EDUCATION/TRAINING PROGRAM

## 2023-11-08 PROCEDURE — 82570 ASSAY OF URINE CREATININE: CPT

## 2023-11-09 LAB
25(OH)D3 SERPL-MCNC: 51 NG/ML (ref 30–100)
ALBUMIN SERPL BCP-MCNC: 4.4 G/DL (ref 3.4–5)
ANION GAP SERPL CALC-SCNC: 15 MMOL/L (ref 10–20)
APPEARANCE UR: CLEAR
BILIRUB UR STRIP.AUTO-MCNC: NEGATIVE MG/DL
BUN SERPL-MCNC: 19 MG/DL (ref 6–23)
CALCIUM SERPL-MCNC: 9.5 MG/DL (ref 8.6–10.6)
CHLORIDE SERPL-SCNC: 100 MMOL/L (ref 98–107)
CO2 SERPL-SCNC: 27 MMOL/L (ref 21–32)
COLOR UR: YELLOW
CREAT SERPL-MCNC: 1.01 MG/DL (ref 0.5–1.05)
CREAT UR-MCNC: 89.2 MG/DL (ref 20–320)
ERYTHROCYTE [DISTWIDTH] IN BLOOD BY AUTOMATED COUNT: 14.6 % (ref 11.5–14.5)
GFR SERPL CREATININE-BSD FRML MDRD: 61 ML/MIN/1.73M*2
GLUCOSE SERPL-MCNC: 93 MG/DL (ref 74–99)
GLUCOSE UR STRIP.AUTO-MCNC: NEGATIVE MG/DL
HCT VFR BLD AUTO: 37 % (ref 36–46)
HGB BLD-MCNC: 11.9 G/DL (ref 12–16)
KETONES UR STRIP.AUTO-MCNC: NEGATIVE MG/DL
LEUKOCYTE ESTERASE UR QL STRIP.AUTO: NEGATIVE
MCH RBC QN AUTO: 28.8 PG (ref 26–34)
MCHC RBC AUTO-ENTMCNC: 32.2 G/DL (ref 32–36)
MCV RBC AUTO: 90 FL (ref 80–100)
NITRITE UR QL STRIP.AUTO: NEGATIVE
NRBC BLD-RTO: 0 /100 WBCS (ref 0–0)
PH UR STRIP.AUTO: 6 [PH]
PHOSPHATE SERPL-MCNC: 4 MG/DL (ref 2.5–4.9)
PLATELET # BLD AUTO: 208 X10*3/UL (ref 150–450)
POTASSIUM SERPL-SCNC: 3.9 MMOL/L (ref 3.5–5.3)
PROT UR STRIP.AUTO-MCNC: NEGATIVE MG/DL
PTH-INTACT SERPL-MCNC: 19.5 PG/ML (ref 18.5–88)
RBC # BLD AUTO: 4.13 X10*6/UL (ref 4–5.2)
RBC # UR STRIP.AUTO: NEGATIVE /UL
SODIUM SERPL-SCNC: 138 MMOL/L (ref 136–145)
SP GR UR STRIP.AUTO: 1.01
UROBILINOGEN UR STRIP.AUTO-MCNC: <2 MG/DL
WBC # BLD AUTO: 5.8 X10*3/UL (ref 4.4–11.3)

## 2023-12-13 DIAGNOSIS — I10 BENIGN HYPERTENSION: ICD-10-CM

## 2023-12-13 RX ORDER — LISINOPRIL 20 MG/1
TABLET ORAL
Qty: 90 TABLET | Refills: 0 | Status: SHIPPED | OUTPATIENT
Start: 2023-12-13 | End: 2024-02-05 | Stop reason: SDUPTHER

## 2023-12-13 RX ORDER — AMLODIPINE BESYLATE 10 MG/1
10 TABLET ORAL DAILY
Qty: 90 TABLET | Refills: 1 | Status: SHIPPED | OUTPATIENT
Start: 2023-12-13 | End: 2024-02-05 | Stop reason: SDUPTHER

## 2023-12-14 DIAGNOSIS — E78.5 HYPERLIPIDEMIA ASSOCIATED WITH TYPE 2 DIABETES MELLITUS (MULTI): ICD-10-CM

## 2023-12-14 DIAGNOSIS — E11.69 HYPERLIPIDEMIA ASSOCIATED WITH TYPE 2 DIABETES MELLITUS (MULTI): ICD-10-CM

## 2023-12-14 RX ORDER — ATORVASTATIN CALCIUM 40 MG/1
40 TABLET, FILM COATED ORAL DAILY
Qty: 90 TABLET | Refills: 1 | Status: SHIPPED | OUTPATIENT
Start: 2023-12-14 | End: 2024-02-05 | Stop reason: SDUPTHER

## 2024-01-31 ENCOUNTER — LAB (OUTPATIENT)
Dept: LAB | Facility: LAB | Age: 69
End: 2024-01-31
Payer: MEDICARE

## 2024-01-31 DIAGNOSIS — D50.8 IRON DEFICIENCY ANEMIA SECONDARY TO INADEQUATE DIETARY IRON INTAKE: Chronic | ICD-10-CM

## 2024-01-31 DIAGNOSIS — E11.22 TYPE 2 DIABETES MELLITUS WITH STAGE 3A CHRONIC KIDNEY DISEASE, WITHOUT LONG-TERM CURRENT USE OF INSULIN (MULTI): ICD-10-CM

## 2024-01-31 DIAGNOSIS — N18.31 TYPE 2 DIABETES MELLITUS WITH STAGE 3A CHRONIC KIDNEY DISEASE, WITHOUT LONG-TERM CURRENT USE OF INSULIN (MULTI): ICD-10-CM

## 2024-01-31 DIAGNOSIS — E53.8 VITAMIN B 12 DEFICIENCY: ICD-10-CM

## 2024-01-31 DIAGNOSIS — E55.9 VITAMIN D DEFICIENCY: ICD-10-CM

## 2024-01-31 DIAGNOSIS — E11.69 HYPERLIPIDEMIA ASSOCIATED WITH TYPE 2 DIABETES MELLITUS (MULTI): ICD-10-CM

## 2024-01-31 DIAGNOSIS — E78.5 HYPERLIPIDEMIA ASSOCIATED WITH TYPE 2 DIABETES MELLITUS (MULTI): ICD-10-CM

## 2024-01-31 LAB
25(OH)D3 SERPL-MCNC: 55 NG/ML (ref 30–100)
ALBUMIN SERPL BCP-MCNC: 4.1 G/DL (ref 3.4–5)
ALP SERPL-CCNC: 44 U/L (ref 33–136)
ALT SERPL W P-5'-P-CCNC: 9 U/L (ref 7–45)
ANION GAP SERPL CALC-SCNC: 13 MMOL/L (ref 10–20)
AST SERPL W P-5'-P-CCNC: 13 U/L (ref 9–39)
BASOPHILS # BLD AUTO: 0.06 X10*3/UL (ref 0–0.1)
BASOPHILS NFR BLD AUTO: 1.5 %
BILIRUB SERPL-MCNC: 0.7 MG/DL (ref 0–1.2)
BUN SERPL-MCNC: 18 MG/DL (ref 6–23)
CALCIUM SERPL-MCNC: 9.8 MG/DL (ref 8.6–10.6)
CHLORIDE SERPL-SCNC: 103 MMOL/L (ref 98–107)
CHOLEST SERPL-MCNC: 156 MG/DL (ref 0–199)
CHOLESTEROL/HDL RATIO: 2.2
CO2 SERPL-SCNC: 28 MMOL/L (ref 21–32)
CREAT SERPL-MCNC: 1.02 MG/DL (ref 0.5–1.05)
EGFRCR SERPLBLD CKD-EPI 2021: 60 ML/MIN/1.73M*2
EOSINOPHIL # BLD AUTO: 0.31 X10*3/UL (ref 0–0.7)
EOSINOPHIL NFR BLD AUTO: 8 %
ERYTHROCYTE [DISTWIDTH] IN BLOOD BY AUTOMATED COUNT: 14.4 % (ref 11.5–14.5)
FERRITIN SERPL-MCNC: 76 NG/ML (ref 8–150)
FOLATE SERPL-MCNC: 15.7 NG/ML
GLUCOSE SERPL-MCNC: 101 MG/DL (ref 74–99)
HCT VFR BLD AUTO: 37.5 % (ref 36–46)
HDLC SERPL-MCNC: 69.9 MG/DL
HGB BLD-MCNC: 12.1 G/DL (ref 12–16)
HGB RETIC QN: 32 PG (ref 28–38)
IMM GRANULOCYTES # BLD AUTO: 0.01 X10*3/UL (ref 0–0.7)
IMM GRANULOCYTES NFR BLD AUTO: 0.3 % (ref 0–0.9)
IMMATURE RETIC FRACTION: 12.5 %
IRON SATN MFR SERPL: 22 % (ref 25–45)
IRON SERPL-MCNC: 81 UG/DL (ref 35–150)
LDLC SERPL CALC-MCNC: 71 MG/DL
LYMPHOCYTES # BLD AUTO: 1.47 X10*3/UL (ref 1.2–4.8)
LYMPHOCYTES NFR BLD AUTO: 37.8 %
MCH RBC QN AUTO: 28.5 PG (ref 26–34)
MCHC RBC AUTO-ENTMCNC: 32.3 G/DL (ref 32–36)
MCV RBC AUTO: 88 FL (ref 80–100)
MONOCYTES # BLD AUTO: 0.48 X10*3/UL (ref 0.1–1)
MONOCYTES NFR BLD AUTO: 12.3 %
NEUTROPHILS # BLD AUTO: 1.56 X10*3/UL (ref 1.2–7.7)
NEUTROPHILS NFR BLD AUTO: 40.1 %
NON HDL CHOLESTEROL: 86 MG/DL (ref 0–149)
NRBC BLD-RTO: 0 /100 WBCS (ref 0–0)
PLATELET # BLD AUTO: 193 X10*3/UL (ref 150–450)
POTASSIUM SERPL-SCNC: 4.4 MMOL/L (ref 3.5–5.3)
PROT SERPL-MCNC: 6.8 G/DL (ref 6.4–8.2)
RBC # BLD AUTO: 4.25 X10*6/UL (ref 4–5.2)
RETICS #: 0.05 X10*6/UL (ref 0.02–0.11)
RETICS/RBC NFR AUTO: 1.1 % (ref 0.5–2)
SODIUM SERPL-SCNC: 140 MMOL/L (ref 136–145)
TIBC SERPL-MCNC: 363 UG/DL (ref 240–445)
TRIGL SERPL-MCNC: 75 MG/DL (ref 0–149)
TSH SERPL-ACNC: 2.13 MIU/L (ref 0.44–3.98)
UIBC SERPL-MCNC: 282 UG/DL (ref 110–370)
VIT B12 SERPL-MCNC: >2000 PG/ML (ref 211–911)
VLDL: 15 MG/DL (ref 0–40)
WBC # BLD AUTO: 3.9 X10*3/UL (ref 4.4–11.3)

## 2024-01-31 PROCEDURE — 82746 ASSAY OF FOLIC ACID SERUM: CPT

## 2024-01-31 PROCEDURE — 83036 HEMOGLOBIN GLYCOSYLATED A1C: CPT

## 2024-01-31 PROCEDURE — 82607 VITAMIN B-12: CPT

## 2024-01-31 PROCEDURE — 85025 COMPLETE CBC W/AUTO DIFF WBC: CPT

## 2024-01-31 PROCEDURE — 80053 COMPREHEN METABOLIC PANEL: CPT

## 2024-01-31 PROCEDURE — 82306 VITAMIN D 25 HYDROXY: CPT

## 2024-01-31 PROCEDURE — 36415 COLL VENOUS BLD VENIPUNCTURE: CPT

## 2024-01-31 PROCEDURE — 82728 ASSAY OF FERRITIN: CPT

## 2024-01-31 PROCEDURE — 80061 LIPID PANEL: CPT

## 2024-01-31 PROCEDURE — 84443 ASSAY THYROID STIM HORMONE: CPT

## 2024-01-31 PROCEDURE — 85045 AUTOMATED RETICULOCYTE COUNT: CPT

## 2024-01-31 PROCEDURE — 83550 IRON BINDING TEST: CPT

## 2024-01-31 PROCEDURE — 83540 ASSAY OF IRON: CPT

## 2024-02-01 LAB
EST. AVERAGE GLUCOSE BLD GHB EST-MCNC: 134 MG/DL
HBA1C MFR BLD: 6.3 %

## 2024-02-05 ENCOUNTER — OFFICE VISIT (OUTPATIENT)
Dept: PRIMARY CARE | Facility: CLINIC | Age: 69
End: 2024-02-05
Payer: MEDICARE

## 2024-02-05 VITALS
HEART RATE: 73 BPM | BODY MASS INDEX: 26.72 KG/M2 | DIASTOLIC BLOOD PRESSURE: 82 MMHG | WEIGHT: 156.5 LBS | SYSTOLIC BLOOD PRESSURE: 129 MMHG | HEIGHT: 64 IN | OXYGEN SATURATION: 97 %

## 2024-02-05 DIAGNOSIS — Z12.31 ENCOUNTER FOR SCREENING MAMMOGRAM FOR BREAST CANCER: ICD-10-CM

## 2024-02-05 DIAGNOSIS — Z78.0 ASYMPTOMATIC MENOPAUSAL STATE: ICD-10-CM

## 2024-02-05 DIAGNOSIS — Z00.00 ROUTINE GENERAL MEDICAL EXAMINATION AT HEALTH CARE FACILITY: Primary | ICD-10-CM

## 2024-02-05 DIAGNOSIS — E11.69 HYPERLIPIDEMIA ASSOCIATED WITH TYPE 2 DIABETES MELLITUS (MULTI): ICD-10-CM

## 2024-02-05 DIAGNOSIS — M54.50 CHRONIC BILATERAL LOW BACK PAIN WITHOUT SCIATICA: ICD-10-CM

## 2024-02-05 DIAGNOSIS — G89.29 CHRONIC BILATERAL LOW BACK PAIN WITHOUT SCIATICA: ICD-10-CM

## 2024-02-05 DIAGNOSIS — E78.5 HYPERLIPIDEMIA ASSOCIATED WITH TYPE 2 DIABETES MELLITUS (MULTI): ICD-10-CM

## 2024-02-05 DIAGNOSIS — Z13.89 SCREENING FOR MULTIPLE CONDITIONS: ICD-10-CM

## 2024-02-05 DIAGNOSIS — Z23 NEED FOR VACCINATION: ICD-10-CM

## 2024-02-05 DIAGNOSIS — E11.65 TYPE 2 DIABETES MELLITUS WITH HYPERGLYCEMIA, WITHOUT LONG-TERM CURRENT USE OF INSULIN (MULTI): ICD-10-CM

## 2024-02-05 DIAGNOSIS — Z71.89 CARDIAC RISK COUNSELING: ICD-10-CM

## 2024-02-05 DIAGNOSIS — I10 BENIGN HYPERTENSION: ICD-10-CM

## 2024-02-05 DIAGNOSIS — Z13.6 SCREENING FOR CARDIOVASCULAR CONDITION: ICD-10-CM

## 2024-02-05 PROBLEM — N18.31 STAGE 3A CHRONIC KIDNEY DISEASE (MULTI): Status: RESOLVED | Noted: 2023-01-19 | Resolved: 2024-02-05

## 2024-02-05 PROBLEM — R51.9 HEADACHE: Status: RESOLVED | Noted: 2023-01-19 | Resolved: 2024-02-05

## 2024-02-05 PROBLEM — D50.8 IRON DEFICIENCY ANEMIA SECONDARY TO INADEQUATE DIETARY IRON INTAKE: Chronic | Status: RESOLVED | Noted: 2023-01-19 | Resolved: 2024-02-05

## 2024-02-05 PROBLEM — F41.9 ANXIETY DISORDER: Status: ACTIVE | Noted: 2024-02-05

## 2024-02-05 PROBLEM — I63.81 ACUTE LACUNAR INFARCTION (MULTI): Status: RESOLVED | Noted: 2024-02-05 | Resolved: 2024-02-05

## 2024-02-05 PROBLEM — I67.9 CEREBROVASCULAR SMALL VESSEL DISEASE: Status: ACTIVE | Noted: 2023-01-19

## 2024-02-05 PROBLEM — E11.22 TYPE 2 DIABETES MELLITUS WITH STAGE 3A CHRONIC KIDNEY DISEASE, WITHOUT LONG-TERM CURRENT USE OF INSULIN (MULTI): Status: RESOLVED | Noted: 2023-01-19 | Resolved: 2024-02-05

## 2024-02-05 PROBLEM — E53.8 COBALAMIN DEFICIENCY: Status: ACTIVE | Noted: 2023-07-03

## 2024-02-05 PROBLEM — N18.31 TYPE 2 DIABETES MELLITUS WITH STAGE 3A CHRONIC KIDNEY DISEASE, WITHOUT LONG-TERM CURRENT USE OF INSULIN (MULTI): Status: RESOLVED | Noted: 2023-01-19 | Resolved: 2024-02-05

## 2024-02-05 PROBLEM — F41.9 ANXIETY DISORDER: Status: RESOLVED | Noted: 2024-02-05 | Resolved: 2024-02-05

## 2024-02-05 PROCEDURE — 93000 ELECTROCARDIOGRAM COMPLETE: CPT | Performed by: FAMILY MEDICINE

## 2024-02-05 PROCEDURE — 1160F RVW MEDS BY RX/DR IN RCRD: CPT | Performed by: FAMILY MEDICINE

## 2024-02-05 PROCEDURE — 99214 OFFICE O/P EST MOD 30 MIN: CPT | Performed by: FAMILY MEDICINE

## 2024-02-05 PROCEDURE — G0446 INTENS BEHAVE THER CARDIO DX: HCPCS | Performed by: FAMILY MEDICINE

## 2024-02-05 PROCEDURE — G0439 PPPS, SUBSEQ VISIT: HCPCS | Performed by: FAMILY MEDICINE

## 2024-02-05 PROCEDURE — 99497 ADVNCD CARE PLAN 30 MIN: CPT | Performed by: FAMILY MEDICINE

## 2024-02-05 PROCEDURE — 1036F TOBACCO NON-USER: CPT | Performed by: FAMILY MEDICINE

## 2024-02-05 PROCEDURE — 93272 ECG/REVIEW INTERPRET ONLY: CPT | Performed by: FAMILY MEDICINE

## 2024-02-05 PROCEDURE — 3074F SYST BP LT 130 MM HG: CPT | Performed by: FAMILY MEDICINE

## 2024-02-05 PROCEDURE — 1159F MED LIST DOCD IN RCRD: CPT | Performed by: FAMILY MEDICINE

## 2024-02-05 PROCEDURE — 4010F ACE/ARB THERAPY RXD/TAKEN: CPT | Performed by: FAMILY MEDICINE

## 2024-02-05 PROCEDURE — 3079F DIAST BP 80-89 MM HG: CPT | Performed by: FAMILY MEDICINE

## 2024-02-05 PROCEDURE — 1158F ADVNC CARE PLAN TLK DOCD: CPT | Performed by: FAMILY MEDICINE

## 2024-02-05 PROCEDURE — 3044F HG A1C LEVEL LT 7.0%: CPT | Performed by: FAMILY MEDICINE

## 2024-02-05 PROCEDURE — 1126F AMNT PAIN NOTED NONE PRSNT: CPT | Performed by: FAMILY MEDICINE

## 2024-02-05 PROCEDURE — G0442 ANNUAL ALCOHOL SCREEN 15 MIN: HCPCS | Performed by: FAMILY MEDICINE

## 2024-02-05 PROCEDURE — 2028F FOOT EXAM PERFORMED: CPT | Performed by: FAMILY MEDICINE

## 2024-02-05 PROCEDURE — 99397 PER PM REEVAL EST PAT 65+ YR: CPT | Performed by: FAMILY MEDICINE

## 2024-02-05 PROCEDURE — 1033F TOBACCO NONSMOKER NOR 2NDHND: CPT | Performed by: FAMILY MEDICINE

## 2024-02-05 PROCEDURE — 3048F LDL-C <100 MG/DL: CPT | Performed by: FAMILY MEDICINE

## 2024-02-05 PROCEDURE — 1170F FXNL STATUS ASSESSED: CPT | Performed by: FAMILY MEDICINE

## 2024-02-05 PROCEDURE — G0444 DEPRESSION SCREEN ANNUAL: HCPCS | Performed by: FAMILY MEDICINE

## 2024-02-05 PROCEDURE — 1123F ACP DISCUSS/DSCN MKR DOCD: CPT | Performed by: FAMILY MEDICINE

## 2024-02-05 RX ORDER — AMLODIPINE BESYLATE 10 MG/1
10 TABLET ORAL DAILY
Qty: 90 TABLET | Refills: 1 | Status: SHIPPED | OUTPATIENT
Start: 2024-02-05 | End: 2024-05-08 | Stop reason: DRUGHIGH

## 2024-02-05 RX ORDER — METFORMIN HYDROCHLORIDE 500 MG/1
500 TABLET, EXTENDED RELEASE ORAL DAILY
Qty: 90 TABLET | Refills: 1 | Status: SHIPPED | OUTPATIENT
Start: 2024-02-05

## 2024-02-05 RX ORDER — ATORVASTATIN CALCIUM 40 MG/1
40 TABLET, FILM COATED ORAL DAILY
Qty: 90 TABLET | Refills: 1 | Status: SHIPPED | OUTPATIENT
Start: 2024-02-05

## 2024-02-05 RX ORDER — LISINOPRIL 20 MG/1
20 TABLET ORAL DAILY
Qty: 90 TABLET | Refills: 1 | Status: SHIPPED | OUTPATIENT
Start: 2024-02-05

## 2024-02-05 ASSESSMENT — ACTIVITIES OF DAILY LIVING (ADL)
BATHING: INDEPENDENT
GROCERY_SHOPPING: INDEPENDENT
MANAGING_FINANCES: INDEPENDENT
DOING_HOUSEWORK: INDEPENDENT
TAKING_MEDICATION: INDEPENDENT
DRESSING: INDEPENDENT

## 2024-02-05 ASSESSMENT — ENCOUNTER SYMPTOMS
COLOR CHANGE: 0
SINUS PRESSURE: 0
WHEEZING: 0
SORE THROAT: 0
LOSS OF SENSATION IN FEET: 0
NAUSEA: 0
VOICE CHANGE: 0
EYE DISCHARGE: 0
ACTIVITY CHANGE: 0
DIZZINESS: 0
ARTHRALGIAS: 0
EYE PAIN: 0
BACK PAIN: 0
CONSTIPATION: 0
COUGH: 0
UNEXPECTED WEIGHT CHANGE: 0
PALPITATIONS: 0
FATIGUE: 0
WOUND: 0
CHILLS: 0
SINUS PAIN: 0
EYE ITCHING: 0
POLYDIPSIA: 0
DIARRHEA: 0
DIAPHORESIS: 0
FACIAL SWELLING: 0
MYALGIAS: 0
FEVER: 0
SLEEP DISTURBANCE: 0
TROUBLE SWALLOWING: 0
DYSURIA: 0
FREQUENCY: 0
OCCASIONAL FEELINGS OF UNSTEADINESS: 0
JOINT SWELLING: 0
EYE REDNESS: 0
RHINORRHEA: 0
DEPRESSION: 0
BLOOD IN STOOL: 0
NECK STIFFNESS: 0
ABDOMINAL PAIN: 0
POLYPHAGIA: 0
NERVOUS/ANXIOUS: 0
AGITATION: 0
APPETITE CHANGE: 0
CHEST TIGHTNESS: 0
ADENOPATHY: 0
SHORTNESS OF BREATH: 0
VOMITING: 0
FLANK PAIN: 0
HEMATURIA: 0
BRUISES/BLEEDS EASILY: 0

## 2024-02-05 ASSESSMENT — VISUAL ACUITY
OD_CC: 20/20
OS_CC: 20/20

## 2024-02-05 NOTE — ACP (ADVANCE CARE PLANNING)
Confirming Previous Code Status:   Advance Care Planning Note     Discussion Date: 02/05/24   Discussion Participants: patient    The patient wishes to discuss Advance Care Planning today and the following is a brief summary of our discussion.     Patient has capacity to make their own medical decisions: Yes  Health Care Agent/Surrogate Decision Maker documented in chart: Yes    Documents on file and valid:  Advance Directive/Living Will: No   Health Care Power of : No  Other: full code discussed    Communication of Medical Status/Prognosis:   good     Communication of Treatment Goals/Options:   good     Treatment Decisions  Goals of Care: survival is prioritized, if goals for quality or survival can reasonably be achieved   agree  Follow Up Plan  To bring in POA and Living will  Team Members  PCP  Time Statement: Total face to face time spent on advance care planning was 16 minutes with 16 minutes spent in counseling, including the explanation.    Melva Cobb MD  2/5/2024 8:54 AM

## 2024-02-05 NOTE — PROGRESS NOTES
Subjective   Reason for Visit: Grecia Pineda is an 68 y.o. female here for a Medicare Wellness visit.     Past Medical, Surgical, and Family History reviewed and updated in chart.    Reviewed all medications by prescribing practitioner or clinical pharmacist (such as prescriptions, OTCs, herbal therapies and supplements) and documented in the medical record.    HPI  Subjective:   Grecia Pineda is a 68 y.o. female with hypertension.  Current Outpatient Medications   Medication Sig Dispense Refill    aspirin 81 mg EC tablet Take 1 tablet (81 mg) by mouth. Take every Mon, Wed & Fri      cholecalciferol (Vitamin D-3) 125 MCG (5000 UT) capsule Take 1 capsule (125 mcg) by mouth once daily.      cyanocobalamin (Vitamin B-12) 1,000 mcg tablet Take by mouth.      magnesium oxide 500 mg tablet Take 1 tablet (500 mg) by mouth once daily.      vitamin E acetate (VITAMIN E ORAL) Take by mouth.      amLODIPine (Norvasc) 10 mg tablet Take 1 tablet (10 mg) by mouth once daily. 90 tablet 1    atorvastatin (Lipitor) 40 mg tablet Take 1 tablet (40 mg) by mouth once daily. 90 tablet 1    ferrous gluconate 324 (38 Fe) mg tablet Take 1 tablet (324 mg) by mouth every other day.      lisinopril 20 mg tablet Take 1 tablet (20 mg) by mouth once daily. 90 tablet 1    metFORMIN  mg 24 hr tablet Take 1 tablet (500 mg) by mouth once daily. 90 tablet 1    zoster vaccine-recombinant adjuvanted (Shingrix) 50 mcg/0.5 mL vaccine Inject 0.5 mL into the muscle 1 time for 1 dose. 0.5 mL 0     No current facility-administered medications for this visit.      Hypertension ROS: taking medications as instructed, no medication side effects noted, no TIA's, no chest pain on exertion, no dyspnea on exertion, no swelling of ankles, no orthostatic dizziness or lightheadedness, no orthopnea or paroxysmal nocturnal dyspnea, no palpitations, and no intermittent claudication symptoms.   New concerns: doing well on her medications.     Objective:   Blood  "Pressure 129/82   Pulse 73   Height 1.619 m (5' 3.75\")   Weight 71 kg (156 lb 8 oz)   Last Menstrual Period 10/03/2008 (Approximate)   Oxygen Saturation 97%   Body Mass Index 27.07 kg/m²      Advance Care Planning Note     Discussion Date: 02/05/24   Discussion Participants: patient    The patient wishes to discuss Advance Care Planning today and the following is a brief summary of our discussion.     Patient has capacity to make their own medical decisions: Yes  Health Care Agent/Surrogate Decision Maker documented in chart: Yes    Documents on file and valid:  Advance Directive/Living Will: No   Health Care Power of : No  Other: code status discussed    Communication of Medical Status/Prognosis:   good     Communication of Treatment Goals/Options:   good     Treatment Decisions  Goals of Care: survival is prioritized, if goals for quality or survival can reasonably be achieved   agree  Follow Up Plan  To bring in POA and Living will  Team Members  PCP  Time Statement: Total face to face time spent on advance care planning was 16 minutes with 16 minutes spent in counseling, including the explanation.    Melva Cobb MD  2/5/2024 9:04 AM  Patient Care Team:  Melva Cobb MD as PCP - General  Melva Cobb MD as PCP - [employer] PCP   Patient Care Team:  Melva Cobb MD as PCP - General  Melva Cobb MD as PCP - [employer] PCP     Immunization History   Administered Date(s) Administered    Flu vaccine (IIV4), preservative free *Check age/dose* 1955, 09/24/2017, 09/29/2018, 09/16/2022    Flu vaccine, quadrivalent, high-dose, preservative free, age 65y+ (FLUZONE) 09/22/2020, 09/28/2021, 09/16/2022, 09/18/2023    Flu vaccine, quadrivalent, no egg protein, age 6 month or greater (FLUCELVAX) 10/09/2019    Hep A / Hep B 05/22/2014    Hep A, Unspecified 05/22/2014    Influenza, Seasonal, Quadrivalent, Adjuvanted 09/18/2023    Influenza, seasonal, injectable 10/23/2016 "    Influenza, seasonal, injectable, preservative free 10/10/2015    Moderna COVID-19 vaccine, Fall 2023, 12 yeasrs and older (50mcg/0.5mL) 10/17/2023    Moderna COVID-19 vaccine, bivalent, blue cap/gray label *Check age/dose* 10/04/2022, 10/14/2022    Moderna SARS-CoV-2 Vaccination 02/15/2021, 02/16/2021, 03/15/2021, 03/22/2021, 10/23/2021, 05/09/2022    Pneumococcal conjugate vaccine, 13-valent (PREVNAR 13) 03/04/2022    Pneumococcal conjugate vaccine, 20-valent (PREVNAR 20) 10/03/2023    Pneumococcal polysaccharide vaccine, 23-valent, age 2 years and older (PNEUMOVAX 23) 04/19/2017    Pneumococcal, Unspecified 07/18/2022    Tdap vaccine, age 7 year and older (BOOSTRIX, ADACEL) 05/22/2014    Zoster vaccine, recombinant, adult (SHINGRIX) 10/19/2018       Review of Systems   Constitutional:  Negative for activity change, appetite change, chills, diaphoresis, fatigue, fever and unexpected weight change.   HENT:  Negative for congestion, dental problem, drooling, ear discharge, ear pain, facial swelling, hearing loss, nosebleeds, postnasal drip, rhinorrhea, sinus pressure, sinus pain, sneezing, sore throat, tinnitus, trouble swallowing and voice change.    Eyes:  Negative for pain, discharge, redness, itching and visual disturbance.   Respiratory:  Negative for cough, chest tightness, shortness of breath and wheezing.    Cardiovascular:  Negative for chest pain, palpitations and leg swelling.   Gastrointestinal:  Negative for abdominal pain, blood in stool, constipation, diarrhea, nausea and vomiting.   Endocrine: Negative for cold intolerance, heat intolerance, polydipsia, polyphagia and polyuria.   Genitourinary:  Negative for decreased urine volume, dysuria, flank pain, frequency, hematuria and urgency.   Musculoskeletal:  Negative for arthralgias, back pain, gait problem, joint swelling, myalgias and neck stiffness.   Skin:  Negative for color change, pallor, rash and wound.   Neurological:  Negative for dizziness.  "  Hematological:  Negative for adenopathy. Does not bruise/bleed easily.   Psychiatric/Behavioral:  Negative for agitation, behavioral problems and sleep disturbance. The patient is not nervous/anxious.        Objective   Vitals:  Blood Pressure 129/82   Pulse 73   Height 1.619 m (5' 3.75\")   Weight 71 kg (156 lb 8 oz)   Last Menstrual Period 10/03/2008 (Approximate)   Oxygen Saturation 97%   Body Mass Index 27.07 kg/m²       Physical Exam  Vitals and nursing note reviewed.   Constitutional:       General: She is not in acute distress.     Appearance: Normal appearance. She is normal weight. She is not ill-appearing.   HENT:      Head: Normocephalic.      Right Ear: Tympanic membrane, ear canal and external ear normal.      Left Ear: Tympanic membrane, ear canal and external ear normal.      Nose: Nose normal. No rhinorrhea.      Mouth/Throat:      Mouth: Mucous membranes are moist.      Pharynx: Oropharynx is clear.   Eyes:      Extraocular Movements: Extraocular movements intact.      Conjunctiva/sclera: Conjunctivae normal.      Pupils: Pupils are equal, round, and reactive to light.   Cardiovascular:      Rate and Rhythm: Normal rate and regular rhythm.      Pulses: Normal pulses.      Heart sounds: Normal heart sounds.   Pulmonary:      Effort: Pulmonary effort is normal. No respiratory distress.      Breath sounds: Normal breath sounds. No wheezing.   Musculoskeletal:         General: Normal range of motion.      Cervical back: Normal range of motion and neck supple. No rigidity or tenderness.      Right lower leg: No edema.      Left lower leg: No edema.   Lymphadenopathy:      Cervical: No cervical adenopathy.   Skin:     General: Skin is warm and dry.   Neurological:      General: No focal deficit present.      Mental Status: She is alert and oriented to person, place, and time.      Sensory: No sensory deficit.      Motor: No weakness.      Coordination: Coordination normal.   Psychiatric:         " Mood and Affect: Mood normal.         Behavior: Behavior normal.         Thought Content: Thought content normal.         Judgment: Judgment normal.       Diabetic foot exam:   Left: Reflexes 4+    Vibratory sensation normal   Proprioception normal   Sharp/dull discrimination normal   Filament test present  Right: Reflexes 4+    Vibratory sensation normal   Proprioception normal   Sharp/dull discrimination normal   Filament test present      Assessment/Plan   Problem List Items Addressed This Visit       Benign hypertension    Relevant Medications    lisinopril 20 mg tablet    amLODIPine (Norvasc) 10 mg tablet    Hyperlipidemia associated with type 2 diabetes mellitus (CMS/HCC)    Relevant Medications    atorvastatin (Lipitor) 40 mg tablet    Chronic bilateral low back pain without sciatica    Type 2 diabetes mellitus with hyperglycemia, without long-term current use of insulin (CMS/HCC)    Relevant Medications    metFORMIN  mg 24 hr tablet    Other Relevant Orders     Diabetes Foot Exam (Completed)     Other Visit Diagnoses       Routine general medical examination at health care facility    -  Primary    Screening for multiple conditions        Screening for cardiovascular condition        Relevant Orders    ECG 12 lead    Cardiac risk counseling        Need for vaccination        Relevant Medications    zoster vaccine-recombinant adjuvanted (Shingrix) 50 mcg/0.5 mL vaccine    Asymptomatic menopausal state        Relevant Orders    XR DEXA bone density    Encounter for screening mammogram for breast cancer        Relevant Orders    BI mammo bilateral screening tomosynthesis          The 10-year ASCVD risk score (Maricarmen HICKS, et al., 2019) is: 16.4%    Values used to calculate the score:      Age: 68 years      Sex: Female      Is Non- : No      Diabetic: Yes      Tobacco smoker: No      Systolic Blood Pressure: 129 mmHg      Is BP treated: Yes      HDL Cholesterol: 69.9 mg/dL       Total Cholesterol: 156 mg/dL         F/U in 3 months for DM    Current Outpatient Medications   Medication Sig Dispense Refill    aspirin 81 mg EC tablet Take 1 tablet (81 mg) by mouth. Take every Mon, Wed & Fri      cholecalciferol (Vitamin D-3) 125 MCG (5000 UT) capsule Take 1 capsule (125 mcg) by mouth once daily.      cyanocobalamin (Vitamin B-12) 1,000 mcg tablet Take by mouth.      magnesium oxide 500 mg tablet Take 1 tablet (500 mg) by mouth once daily.      vitamin E acetate (VITAMIN E ORAL) Take by mouth.      amLODIPine (Norvasc) 10 mg tablet Take 1 tablet (10 mg) by mouth once daily. 90 tablet 1    atorvastatin (Lipitor) 40 mg tablet Take 1 tablet (40 mg) by mouth once daily. 90 tablet 1    ferrous gluconate 324 (38 Fe) mg tablet Take 1 tablet (324 mg) by mouth every other day.      lisinopril 20 mg tablet Take 1 tablet (20 mg) by mouth once daily. 90 tablet 1    metFORMIN  mg 24 hr tablet Take 1 tablet (500 mg) by mouth once daily. 90 tablet 1    zoster vaccine-recombinant adjuvanted (Shingrix) 50 mcg/0.5 mL vaccine Inject 0.5 mL into the muscle 1 time for 1 dose. 0.5 mL 0     No current facility-administered medications for this visit.     Medications have been sent to the pharmacy    Please bring in copy of living will and POA

## 2024-02-05 NOTE — PATIENT INSTRUCTIONS
F/U in 3 months for DM    Current Outpatient Medications   Medication Sig Dispense Refill    aspirin 81 mg EC tablet Take 1 tablet (81 mg) by mouth. Take every Mon, Wed & Fri      cholecalciferol (Vitamin D-3) 125 MCG (5000 UT) capsule Take 1 capsule (125 mcg) by mouth once daily.      cyanocobalamin (Vitamin B-12) 1,000 mcg tablet Take by mouth.      magnesium oxide 500 mg tablet Take 1 tablet (500 mg) by mouth once daily.      vitamin E acetate (VITAMIN E ORAL) Take by mouth.      amLODIPine (Norvasc) 10 mg tablet Take 1 tablet (10 mg) by mouth once daily. 90 tablet 1    atorvastatin (Lipitor) 40 mg tablet Take 1 tablet (40 mg) by mouth once daily. 90 tablet 1    ferrous gluconate 324 (38 Fe) mg tablet Take 1 tablet (324 mg) by mouth every other day.      lisinopril 20 mg tablet Take 1 tablet (20 mg) by mouth once daily. 90 tablet 1    metFORMIN  mg 24 hr tablet Take 1 tablet (500 mg) by mouth once daily. 90 tablet 1    zoster vaccine-recombinant adjuvanted (Shingrix) 50 mcg/0.5 mL vaccine Inject 0.5 mL into the muscle 1 time for 1 dose. 0.5 mL 0     No current facility-administered medications for this visit.     Medications have been sent to the pharmacy    Please bring in copy of living will and POA

## 2024-03-06 ENCOUNTER — APPOINTMENT (OUTPATIENT)
Dept: RADIOLOGY | Facility: CLINIC | Age: 69
End: 2024-03-06
Payer: MEDICARE

## 2024-03-15 ENCOUNTER — HOSPITAL ENCOUNTER (OUTPATIENT)
Dept: RADIOLOGY | Facility: CLINIC | Age: 69
Discharge: HOME | End: 2024-03-15
Payer: MEDICARE

## 2024-03-15 VITALS — WEIGHT: 155 LBS | BODY MASS INDEX: 26.46 KG/M2 | HEIGHT: 64 IN

## 2024-03-15 DIAGNOSIS — Z12.31 ENCOUNTER FOR SCREENING MAMMOGRAM FOR BREAST CANCER: ICD-10-CM

## 2024-03-15 PROCEDURE — 77067 SCR MAMMO BI INCL CAD: CPT

## 2024-03-15 PROCEDURE — 77063 BREAST TOMOSYNTHESIS BI: CPT | Performed by: STUDENT IN AN ORGANIZED HEALTH CARE EDUCATION/TRAINING PROGRAM

## 2024-03-15 PROCEDURE — 77067 SCR MAMMO BI INCL CAD: CPT | Performed by: STUDENT IN AN ORGANIZED HEALTH CARE EDUCATION/TRAINING PROGRAM

## 2024-05-08 ENCOUNTER — OFFICE VISIT (OUTPATIENT)
Dept: PRIMARY CARE | Facility: CLINIC | Age: 69
End: 2024-05-08
Payer: MEDICARE

## 2024-05-08 VITALS
SYSTOLIC BLOOD PRESSURE: 105 MMHG | HEART RATE: 74 BPM | BODY MASS INDEX: 25.92 KG/M2 | DIASTOLIC BLOOD PRESSURE: 70 MMHG | WEIGHT: 151 LBS | OXYGEN SATURATION: 99 %

## 2024-05-08 DIAGNOSIS — G89.29 CHRONIC BILATERAL LOW BACK PAIN WITHOUT SCIATICA: ICD-10-CM

## 2024-05-08 DIAGNOSIS — I10 BENIGN HYPERTENSION: ICD-10-CM

## 2024-05-08 DIAGNOSIS — E11.69 HYPERLIPIDEMIA ASSOCIATED WITH TYPE 2 DIABETES MELLITUS (MULTI): ICD-10-CM

## 2024-05-08 DIAGNOSIS — M54.50 CHRONIC BILATERAL LOW BACK PAIN WITHOUT SCIATICA: ICD-10-CM

## 2024-05-08 DIAGNOSIS — E78.5 HYPERLIPIDEMIA ASSOCIATED WITH TYPE 2 DIABETES MELLITUS (MULTI): ICD-10-CM

## 2024-05-08 DIAGNOSIS — R25.2 BILATERAL LEG CRAMPS: ICD-10-CM

## 2024-05-08 DIAGNOSIS — E11.65 TYPE 2 DIABETES MELLITUS WITH HYPERGLYCEMIA, WITHOUT LONG-TERM CURRENT USE OF INSULIN (MULTI): Primary | ICD-10-CM

## 2024-05-08 LAB — POC HEMOGLOBIN A1C: 6.5 % (ref 4.2–6.5)

## 2024-05-08 PROCEDURE — 3044F HG A1C LEVEL LT 7.0%: CPT | Performed by: FAMILY MEDICINE

## 2024-05-08 PROCEDURE — 99214 OFFICE O/P EST MOD 30 MIN: CPT | Performed by: FAMILY MEDICINE

## 2024-05-08 PROCEDURE — 4010F ACE/ARB THERAPY RXD/TAKEN: CPT | Performed by: FAMILY MEDICINE

## 2024-05-08 PROCEDURE — 3048F LDL-C <100 MG/DL: CPT | Performed by: FAMILY MEDICINE

## 2024-05-08 PROCEDURE — 1160F RVW MEDS BY RX/DR IN RCRD: CPT | Performed by: FAMILY MEDICINE

## 2024-05-08 PROCEDURE — 1036F TOBACCO NON-USER: CPT | Performed by: FAMILY MEDICINE

## 2024-05-08 PROCEDURE — 2028F FOOT EXAM PERFORMED: CPT | Performed by: FAMILY MEDICINE

## 2024-05-08 PROCEDURE — 1159F MED LIST DOCD IN RCRD: CPT | Performed by: FAMILY MEDICINE

## 2024-05-08 PROCEDURE — 3078F DIAST BP <80 MM HG: CPT | Performed by: FAMILY MEDICINE

## 2024-05-08 PROCEDURE — 3074F SYST BP LT 130 MM HG: CPT | Performed by: FAMILY MEDICINE

## 2024-05-08 PROCEDURE — 83036 HEMOGLOBIN GLYCOSYLATED A1C: CPT | Performed by: FAMILY MEDICINE

## 2024-05-08 PROCEDURE — 1123F ACP DISCUSS/DSCN MKR DOCD: CPT | Performed by: FAMILY MEDICINE

## 2024-05-08 RX ORDER — BACLOFEN 20 MG
1 TABLET ORAL DAILY
COMMUNITY
Start: 2024-05-08

## 2024-05-08 RX ORDER — AMLODIPINE BESYLATE 10 MG/1
5 TABLET ORAL DAILY
COMMUNITY
Start: 2024-05-08

## 2024-05-08 ASSESSMENT — ENCOUNTER SYMPTOMS
FREQUENCY: 0
COLOR CHANGE: 0
SHORTNESS OF BREATH: 0
EYE PAIN: 0
FATIGUE: 0
HEADACHES: 0
EYE DISCHARGE: 0
COUGH: 0
SINUS PAIN: 0
WOUND: 0
DIABETIC ASSOCIATED SYMPTOMS: 0
SINUS PRESSURE: 0
SLEEP DISTURBANCE: 0
BRUISES/BLEEDS EASILY: 0
WHEEZING: 0
ARTHRALGIAS: 0
HUNGER: 0
DIAPHORESIS: 0
POLYPHAGIA: 0
DIZZINESS: 0
PALPITATIONS: 0
RHINORRHEA: 0
FLANK PAIN: 0
EYE REDNESS: 0
BLOOD IN STOOL: 0
ADENOPATHY: 0
FACIAL SWELLING: 0
FEVER: 0
CONSTIPATION: 0
NERVOUS/ANXIOUS: 0
VOMITING: 0
SEIZURES: 0
ABDOMINAL PAIN: 0
APPETITE CHANGE: 0
DYSURIA: 0
VOICE CHANGE: 0
NECK STIFFNESS: 0
JOINT SWELLING: 0
SORE THROAT: 0
BLURRED VISION: 0
EYE ITCHING: 0
DIARRHEA: 0
NAUSEA: 0
CONFUSION: 0
CHEST TIGHTNESS: 0
TROUBLE SWALLOWING: 0
BACK PAIN: 0
AGITATION: 0
UNEXPECTED WEIGHT CHANGE: 0
BLACKOUTS: 0
HEMATURIA: 0
ACTIVITY CHANGE: 0
CHILLS: 0
MYALGIAS: 0
POLYDIPSIA: 0

## 2024-05-08 NOTE — ASSESSMENT & PLAN NOTE
Stable on current medications excpet lower dose of Amlodipine  Continue meds and exercise.   Hypertension Plan  Continue current treatment regimen.  On no meds for BP and needs none at this time.  Medication changes per orders.  Dietary sodium restriction.  Regular aerobic exercise.  Weight loss.  Reduce stress.  Patient Education: Reviewed risks of hypertension and principles of   treatment.

## 2024-05-08 NOTE — PATIENT INSTRUCTIONS
F/U in 1 month for a Nurse visit for a blood pressure check  Hold the Atorvastatin for 1 week and call in 1 week  F/U in 3 months for diabetes and htn  Call if leg cramps continue      Current Outpatient Medications   Medication Sig Dispense Refill    aspirin 81 mg EC tablet Take 1 tablet (81 mg) by mouth. Take every Mon, Wed & Fri      atorvastatin (Lipitor) 40 mg tablet Take 1 tablet (40 mg) by mouth once daily. 90 tablet 1    CALCIUM ORAL Take by mouth.      cholecalciferol (Vitamin D-3) 125 MCG (5000 UT) capsule Take 1 capsule (125 mcg) by mouth once daily.      lisinopril 20 mg tablet Take 1 tablet (20 mg) by mouth once daily. 90 tablet 1    metFORMIN  mg 24 hr tablet Take 1 tablet (500 mg) by mouth once daily. 90 tablet 1    vitamin E acetate (VITAMIN E ORAL) Take 400 Units by mouth once daily.      amLODIPine (Norvasc) 10 mg tablet Take 0.5 tablets (5 mg) by mouth once daily.      magnesium oxide 500 mg magnesium tablet Take 1 tablet (500 mg) by mouth once daily.       No current facility-administered medications for this visit.     Dear Ms. Pineda:     To help you more effectively manage your high blood pressure, we would like to remind you of the following preventive measures you can take at home:    1) Eat right: Your diet should be rich in fruits, vegetables, potassium, and low-fat dairy products. You should also reduce your intake of sodium and fats, particularly saturated fats.    2) Maintain a healthy weight: Try to achieve and maintain a healthy weight. If you are unsure what this means for you, please contact our clinic.    3) Exercise: Try to get at least 30 minutes of aerobic exercise every day.    4) Moderate your alcohol consumption: Limit your alcohol intake to one drink per day.    5) Monitor your blood pressure: You should check your blood pressure regularly. Notify our clinic if your blood pressure readings are consistently higher than recommended.    We have included a personalized  hypertension report card on the following page that lists your most recent blood pressure readings and lab results, along with your ideal values. If you have any questions or concerns about these instructions, your results, or your improving health, please don't hesitate to call.    Thank you for including us as members of your health care team.    [unfilled]    Sincerely,         Melva Cobb MD    Enclosure      Hypertension Report Card for Grecia Pineda  May 8, 2024:     Below is a summary of recent tests related to your hypertension that can help you manage your health.     Blood Pressure:   Normal blood pressure values are 120/80 or lower. Your blood pressure values should be less than 140/90. If your readings at home are consistently higher than this, please contact me.     Your most recent blood pressure readings at our clinic were:   BP Readings from Last 3 Encounters:   05/08/24 105/70   02/05/24 129/82   11/01/23 116/69       Creatinine:   High blood pressure can cause a loss of kidney function. Creatinine is a measure of this kidney function.      Your most recent creatinine levels were:   Creatinine (mg/dL)   Date Value   01/31/2024 1.02   11/08/2023 1.01   10/02/2023 1.04           Potassium:  Potassium is an electrolyte in your blood that can be affected by blood pressure treatment.     Your most recent potassium levels were:  Potassium (mmol/L)   Date Value   01/31/2024 4.4   11/08/2023 3.9   10/02/2023 4.4

## 2024-05-08 NOTE — PROGRESS NOTES
Subjective   Patient ID: Grecia Pineda is a 69 y.o. female who presents for Diabetes and Leg Cramps.  Today she is accompanied by alone.     Diabetes  She presents for her follow-up diabetic visit. She has type 2 diabetes mellitus. No MedicAlert identification noted. Her disease course has been stable. There are no hypoglycemic associated symptoms. Pertinent negatives for hypoglycemia include no confusion, dizziness, headaches, hunger, mood changes, nervousness/anxiousness, pallor or seizures. There are no diabetic associated symptoms. Pertinent negatives for diabetes include no blurred vision, no chest pain, no fatigue, no foot paresthesias, no foot ulcerations, no polydipsia, no polyphagia and no polyuria. There are no hypoglycemic complications. Pertinent negatives for hypoglycemia complications include no blackouts, no hospitalization, no nocturnal hypoglycemia, no required assistance and no required glucagon injection. Symptoms are stable. There are no diabetic complications. Pertinent negatives for diabetic complications include no autonomic neuropathy, CVA, heart disease, impotence, nephropathy, peripheral neuropathy, PVD or retinopathy. Risk factors for coronary artery disease include post-menopausal and hypertension. Current diabetic treatment includes oral agent (monotherapy). She is compliant with treatment all of the time. There is no change in her home blood glucose trend. An ACE inhibitor/angiotensin II receptor blocker is being taken. She does not see a podiatrist.Eye exam is not current.   Has been getting increased b/l leg cramps corine at night  Diabetic eye exam done in 3/4/2024 by Dr Matta  Stopped all her vitamins    Current Outpatient Medications   Medication Sig Dispense Refill    aspirin 81 mg EC tablet Take 1 tablet (81 mg) by mouth. Take every Mon, Wed & Fri      atorvastatin (Lipitor) 40 mg tablet Take 1 tablet (40 mg) by mouth once daily. 90 tablet 1    CALCIUM ORAL Take by mouth.       cholecalciferol (Vitamin D-3) 125 MCG (5000 UT) capsule Take 1 capsule (125 mcg) by mouth once daily.      lisinopril 20 mg tablet Take 1 tablet (20 mg) by mouth once daily. 90 tablet 1    metFORMIN  mg 24 hr tablet Take 1 tablet (500 mg) by mouth once daily. 90 tablet 1    vitamin E acetate (VITAMIN E ORAL) Take 400 Units by mouth once daily.      amLODIPine (Norvasc) 10 mg tablet Take 0.5 tablets (5 mg) by mouth once daily.      magnesium oxide 500 mg magnesium tablet Take 1 tablet (500 mg) by mouth once daily.       No current facility-administered medications for this visit.       Review of Systems   Constitutional:  Negative for activity change, appetite change, chills, diaphoresis, fatigue, fever and unexpected weight change.   HENT:  Negative for congestion, dental problem, drooling, ear discharge, ear pain, facial swelling, hearing loss, nosebleeds, postnasal drip, rhinorrhea, sinus pressure, sinus pain, sneezing, sore throat, tinnitus, trouble swallowing and voice change.    Eyes:  Negative for blurred vision, pain, discharge, redness, itching and visual disturbance.   Respiratory:  Negative for cough, chest tightness, shortness of breath and wheezing.    Cardiovascular:  Negative for chest pain, palpitations and leg swelling.   Gastrointestinal:  Negative for abdominal pain, blood in stool, constipation, diarrhea, nausea and vomiting.   Endocrine: Negative for cold intolerance, heat intolerance, polydipsia, polyphagia and polyuria.   Genitourinary:  Negative for decreased urine volume, dysuria, flank pain, frequency, hematuria, impotence and urgency.   Musculoskeletal:  Negative for arthralgias, back pain, gait problem, joint swelling, myalgias and neck stiffness.   Skin:  Negative for color change, pallor, rash and wound.   Neurological:  Negative for dizziness, seizures and headaches.   Hematological:  Negative for adenopathy. Does not bruise/bleed easily.   Psychiatric/Behavioral:  Negative for  agitation, behavioral problems, confusion and sleep disturbance. The patient is not nervous/anxious.        Objective   /70   Pulse 74   Wt 68.5 kg (151 lb)   LMP 10/03/2008 (Approximate)   SpO2 99%   BMI 25.92 kg/m²   BSA: 1.76 meters squared  Growth percentiles: Facility age limit for growth %kimberlyn is 20 years. Facility age limit for growth %kimberlyn is 20 years.     Physical Exam  Vitals and nursing note reviewed.   Constitutional:       General: She is not in acute distress.     Appearance: Normal appearance. She is normal weight. She is not ill-appearing.   HENT:      Head: Normocephalic.      Right Ear: Tympanic membrane, ear canal and external ear normal.      Left Ear: Tympanic membrane, ear canal and external ear normal.      Nose: Nose normal. No rhinorrhea.      Mouth/Throat:      Mouth: Mucous membranes are moist.      Pharynx: Oropharynx is clear.   Eyes:      Extraocular Movements: Extraocular movements intact.      Conjunctiva/sclera: Conjunctivae normal.      Pupils: Pupils are equal, round, and reactive to light.   Cardiovascular:      Rate and Rhythm: Normal rate and regular rhythm.      Pulses: Normal pulses.      Heart sounds: Normal heart sounds.   Pulmonary:      Effort: Pulmonary effort is normal. No respiratory distress.      Breath sounds: Normal breath sounds. No wheezing.   Musculoskeletal:         General: Normal range of motion.      Cervical back: Normal range of motion and neck supple. No rigidity or tenderness.      Right lower leg: No edema.      Left lower leg: No edema.   Lymphadenopathy:      Cervical: No cervical adenopathy.   Skin:     General: Skin is warm and dry.   Neurological:      General: No focal deficit present.      Mental Status: She is alert and oriented to person, place, and time.      Sensory: No sensory deficit.      Motor: No weakness.      Coordination: Coordination normal.   Psychiatric:         Mood and Affect: Mood normal.         Behavior: Behavior  normal.         Thought Content: Thought content normal.         Judgment: Judgment normal.         Assessment/Plan   Problem List Items Addressed This Visit             ICD-10-CM    Benign hypertension I10     Stable on current medications excpet lower dose of Amlodipine  Continue meds and exercise.   Hypertension Plan  Continue current treatment regimen.  On no meds for BP and needs none at this time.  Medication changes per orders.  Dietary sodium restriction.  Regular aerobic exercise.  Weight loss.  Reduce stress.  Patient Education: Reviewed risks of hypertension and principles of   treatment.             Relevant Medications    amLODIPine (Norvasc) 10 mg tablet    Hyperlipidemia associated with type 2 diabetes mellitus (Multi) E11.69, E78.5     Stable on current medications  Continue meds and exercise.          Chronic bilateral low back pain without sciatica M54.50, G89.29     Focus on aquatic therapy and core strengthening.          Type 2 diabetes mellitus with hyperglycemia, without long-term current use of insulin (Multi) - Primary E11.65     Diabetes mellitus Type II, under good control.  Discussed general issues about diabetes pathophysiology and management.  Counseling at today's visit: discussed the need for weight loss, focused on the need for regular aerobic exercise, focused on the need to adhere to the prescribed ADA diet, discussed the advantages of a diet low in carbohydrates, and reminded to check sugars regularly and to bring readings in at the time of the next visit.  Educational material distributed.  Addressed ADA diet.  Suggested low cholesterol diet.  Encouraged aerobic exercise.           Relevant Orders    POCT glycosylated hemoglobin (Hb A1C) manually resulted (Completed)     Other Visit Diagnoses         Codes    Bilateral leg cramps     R25.2    Relevant Medications    magnesium oxide 500 mg magnesium tablet          Current Outpatient Medications   Medication Sig Dispense Refill     aspirin 81 mg EC tablet Take 1 tablet (81 mg) by mouth. Take every Mon, Wed & Fri      atorvastatin (Lipitor) 40 mg tablet Take 1 tablet (40 mg) by mouth once daily. 90 tablet 1    CALCIUM ORAL Take by mouth.      cholecalciferol (Vitamin D-3) 125 MCG (5000 UT) capsule Take 1 capsule (125 mcg) by mouth once daily.      lisinopril 20 mg tablet Take 1 tablet (20 mg) by mouth once daily. 90 tablet 1    metFORMIN  mg 24 hr tablet Take 1 tablet (500 mg) by mouth once daily. 90 tablet 1    vitamin E acetate (VITAMIN E ORAL) Take 400 Units by mouth once daily.      amLODIPine (Norvasc) 10 mg tablet Take 0.5 tablets (5 mg) by mouth once daily.      magnesium oxide 500 mg magnesium tablet Take 1 tablet (500 mg) by mouth once daily.       No current facility-administered medications for this visit.         F/U in 1 month for a Nurse visit for a blood pressure check  Hold the Atorvastatin for 1 week and call in 1 week  F/U in 3 months for diabetes and htn  Call if leg cramps continue

## 2024-05-08 NOTE — ASSESSMENT & PLAN NOTE
Diabetes mellitus Type II, under good control.  Discussed general issues about diabetes pathophysiology and management.  Counseling at today's visit: discussed the need for weight loss, focused on the need for regular aerobic exercise, focused on the need to adhere to the prescribed ADA diet, discussed the advantages of a diet low in carbohydrates, and reminded to check sugars regularly and to bring readings in at the time of the next visit.  Educational material distributed.  Addressed ADA diet.  Suggested low cholesterol diet.  Encouraged aerobic exercise.

## 2024-06-04 ENCOUNTER — PATIENT MESSAGE (OUTPATIENT)
Dept: PRIMARY CARE | Facility: CLINIC | Age: 69
End: 2024-06-04
Payer: MEDICARE

## 2024-06-04 ENCOUNTER — TELEPHONE (OUTPATIENT)
Dept: PRIMARY CARE | Facility: CLINIC | Age: 69
End: 2024-06-04
Payer: MEDICARE

## 2024-06-04 DIAGNOSIS — R04.0 EPISTAXIS: ICD-10-CM

## 2024-06-04 NOTE — TELEPHONE ENCOUNTER
Pt called in stating she's been getting bloody noses. She's had 3 in the last week in a half. One lasting 10 minutes. Pt is set to come in next week on Monday  Hollie 10 for a blood pressure check and she would like to talk about her bloody noses because they are getting worse and getting harder to stop. Pt states they tend to happen when she's getting out the shower. Pt states this is not normal for her.  Please advise    Pt number: [phone]

## 2024-06-10 ENCOUNTER — CLINICAL SUPPORT (OUTPATIENT)
Dept: PRIMARY CARE | Facility: CLINIC | Age: 69
End: 2024-06-10
Payer: MEDICARE

## 2024-06-10 VITALS — SYSTOLIC BLOOD PRESSURE: 120 MMHG | HEART RATE: 67 BPM | DIASTOLIC BLOOD PRESSURE: 64 MMHG

## 2024-06-10 DIAGNOSIS — I10 BENIGN HYPERTENSION: ICD-10-CM

## 2024-06-10 NOTE — PROGRESS NOTES
Pt presents for BP check per Dr Cobb since Dr had cut her amlodipine dosage in half since BP was 105/70, the last time she was in the office. BP was 120/64 today.  said, looks good.

## 2024-08-14 ENCOUNTER — APPOINTMENT (OUTPATIENT)
Dept: PRIMARY CARE | Facility: CLINIC | Age: 69
End: 2024-08-14
Payer: MEDICARE

## 2024-08-14 VITALS
RESPIRATION RATE: 16 BRPM | SYSTOLIC BLOOD PRESSURE: 122 MMHG | HEIGHT: 64 IN | WEIGHT: 149 LBS | HEART RATE: 78 BPM | BODY MASS INDEX: 25.44 KG/M2 | OXYGEN SATURATION: 98 % | DIASTOLIC BLOOD PRESSURE: 80 MMHG

## 2024-08-14 DIAGNOSIS — E78.5 HYPERLIPIDEMIA ASSOCIATED WITH TYPE 2 DIABETES MELLITUS (MULTI): ICD-10-CM

## 2024-08-14 DIAGNOSIS — B37.2 CANDIDA INFECTION OF FLEXURAL SKIN: ICD-10-CM

## 2024-08-14 DIAGNOSIS — L27.0 DERMATITIS DUE TO DRUG REACTION: ICD-10-CM

## 2024-08-14 DIAGNOSIS — L03.312 CELLULITIS OF BACK: ICD-10-CM

## 2024-08-14 DIAGNOSIS — E53.8 COBALAMIN DEFICIENCY: ICD-10-CM

## 2024-08-14 DIAGNOSIS — E11.69 HYPERLIPIDEMIA ASSOCIATED WITH TYPE 2 DIABETES MELLITUS (MULTI): ICD-10-CM

## 2024-08-14 DIAGNOSIS — E11.65 TYPE 2 DIABETES MELLITUS WITH HYPERGLYCEMIA, WITHOUT LONG-TERM CURRENT USE OF INSULIN (MULTI): Primary | ICD-10-CM

## 2024-08-14 DIAGNOSIS — I10 BENIGN HYPERTENSION: ICD-10-CM

## 2024-08-14 LAB — POC HEMOGLOBIN A1C: 6.1 % (ref 4.2–6.5)

## 2024-08-14 PROCEDURE — G2211 COMPLEX E/M VISIT ADD ON: HCPCS | Performed by: FAMILY MEDICINE

## 2024-08-14 PROCEDURE — 3074F SYST BP LT 130 MM HG: CPT | Performed by: FAMILY MEDICINE

## 2024-08-14 PROCEDURE — 3044F HG A1C LEVEL LT 7.0%: CPT | Performed by: FAMILY MEDICINE

## 2024-08-14 PROCEDURE — 4010F ACE/ARB THERAPY RXD/TAKEN: CPT | Performed by: FAMILY MEDICINE

## 2024-08-14 PROCEDURE — 3008F BODY MASS INDEX DOCD: CPT | Performed by: FAMILY MEDICINE

## 2024-08-14 PROCEDURE — 83036 HEMOGLOBIN GLYCOSYLATED A1C: CPT | Performed by: FAMILY MEDICINE

## 2024-08-14 PROCEDURE — 1159F MED LIST DOCD IN RCRD: CPT | Performed by: FAMILY MEDICINE

## 2024-08-14 PROCEDURE — 1160F RVW MEDS BY RX/DR IN RCRD: CPT | Performed by: FAMILY MEDICINE

## 2024-08-14 PROCEDURE — 1123F ACP DISCUSS/DSCN MKR DOCD: CPT | Performed by: FAMILY MEDICINE

## 2024-08-14 PROCEDURE — 2028F FOOT EXAM PERFORMED: CPT | Performed by: FAMILY MEDICINE

## 2024-08-14 PROCEDURE — 3079F DIAST BP 80-89 MM HG: CPT | Performed by: FAMILY MEDICINE

## 2024-08-14 PROCEDURE — 99215 OFFICE O/P EST HI 40 MIN: CPT | Performed by: FAMILY MEDICINE

## 2024-08-14 PROCEDURE — 3048F LDL-C <100 MG/DL: CPT | Performed by: FAMILY MEDICINE

## 2024-08-14 RX ORDER — ATORVASTATIN CALCIUM 20 MG/1
20 TABLET, FILM COATED ORAL DAILY
COMMUNITY
Start: 2024-08-14

## 2024-08-14 RX ORDER — METHYLPREDNISOLONE 4 MG/1
TABLET ORAL
Qty: 21 TABLET | Refills: 0 | Status: SHIPPED | OUTPATIENT
Start: 2024-08-14 | End: 2024-08-21

## 2024-08-14 RX ORDER — CLOTRIMAZOLE AND BETAMETHASONE DIPROPIONATE 10; .64 MG/G; MG/G
1 CREAM TOPICAL 2 TIMES DAILY
Qty: 45 G | Refills: 0 | Status: SHIPPED | OUTPATIENT
Start: 2024-08-14

## 2024-08-14 RX ORDER — MUPIROCIN 20 MG/G
OINTMENT TOPICAL 3 TIMES DAILY
Qty: 22 G | Refills: 0 | Status: SHIPPED | OUTPATIENT
Start: 2024-08-14 | End: 2024-08-24

## 2024-08-14 ASSESSMENT — ENCOUNTER SYMPTOMS
FLANK PAIN: 0
SINUS PAIN: 0
ABDOMINAL PAIN: 0
VOMITING: 0
BRUISES/BLEEDS EASILY: 0
UNEXPECTED WEIGHT CHANGE: 0
FEVER: 0
JOINT SWELLING: 0
DIAPHORESIS: 0
EYE ITCHING: 0
ARTHRALGIAS: 0
COUGH: 0
SHORTNESS OF BREATH: 0
PALPITATIONS: 0
LOSS OF SENSATION IN FEET: 0
NAUSEA: 0
WOUND: 0
FACIAL SWELLING: 0
DYSURIA: 0
AGITATION: 0
RHINORRHEA: 0
POLYDIPSIA: 0
HEMATURIA: 0
POLYPHAGIA: 0
NECK STIFFNESS: 0
NERVOUS/ANXIOUS: 0
CONSTIPATION: 0
TROUBLE SWALLOWING: 0
CHEST TIGHTNESS: 0
ACTIVITY CHANGE: 0
DEPRESSION: 0
DIARRHEA: 0
SORE THROAT: 0
WHEEZING: 0
SLEEP DISTURBANCE: 0
FATIGUE: 0
DIZZINESS: 0
CHILLS: 0
APPETITE CHANGE: 0
VOICE CHANGE: 0
BACK PAIN: 0
MYALGIAS: 0
SINUS PRESSURE: 0
EYE PAIN: 0
FREQUENCY: 0
OCCASIONAL FEELINGS OF UNSTEADINESS: 0
EYE DISCHARGE: 0
BLOOD IN STOOL: 0
COLOR CHANGE: 0
EYE REDNESS: 0
ADENOPATHY: 0

## 2024-08-14 ASSESSMENT — PATIENT HEALTH QUESTIONNAIRE - PHQ9
SUM OF ALL RESPONSES TO PHQ9 QUESTIONS 1 AND 2: 0
1. LITTLE INTEREST OR PLEASURE IN DOING THINGS: NOT AT ALL
10. IF YOU CHECKED OFF ANY PROBLEMS, HOW DIFFICULT HAVE THESE PROBLEMS MADE IT FOR YOU TO DO YOUR WORK, TAKE CARE OF THINGS AT HOME, OR GET ALONG WITH OTHER PEOPLE: NOT DIFFICULT AT ALL
2. FEELING DOWN, DEPRESSED OR HOPELESS: NOT AT ALL

## 2024-08-14 NOTE — PROGRESS NOTES
Subjective   Patient ID: Grecia Pineda is a 69 y.o. female who presents for Follow-up (3 months;  DM).  Today she is accompanied by alone.     Diabetes  She presents for her follow-up diabetic visit. She has type 2 diabetes mellitus. No MedicAlert identification noted. There are no hypoglycemic associated symptoms. Pertinent negatives for hypoglycemia include no dizziness, nervousness/anxiousness or pallor. There are no diabetic associated symptoms. Pertinent negatives for diabetes include no chest pain, no fatigue, no polydipsia, no polyphagia and no polyuria. There are no hypoglycemic complications. There are no diabetic complications. There are no known risk factors for coronary artery disease. Current diabetic treatment includes oral agent (monotherapy). She is compliant with treatment all of the time. Her weight is stable. She is following a generally healthy diet. Meal planning includes avoidance of concentrated sweets. She has not had a previous visit with a dietitian. An ACE inhibitor/angiotensin II receptor blocker is being taken. She does not see a podiatrist.Eye exam is current.     Subjective:   Grecia Pineda is a 69 y.o. female with hypertension.  Current Outpatient Medications   Medication Sig Dispense Refill    aspirin 81 mg EC tablet Take 1 tablet (81 mg) by mouth. Take every Mon, Wed & Fri      CALCIUM ORAL Take by mouth.      cholecalciferol (Vitamin D-3) 125 MCG (5000 UT) capsule Take 1 capsule (125 mcg) by mouth once daily.      lisinopril 20 mg tablet Take 1 tablet (20 mg) by mouth once daily. 90 tablet 1    magnesium oxide 500 mg magnesium tablet Take 1 tablet (500 mg) by mouth once daily.      metFORMIN  mg 24 hr tablet Take 1 tablet (500 mg) by mouth once daily. 90 tablet 1    vitamin E acetate (VITAMIN E ORAL) Take 400 Units by mouth once daily.      atorvastatin (Lipitor) 20 mg tablet Take 1 tablet (20 mg) by mouth once daily.      clotrimazole-betamethasone (Lotrisone) cream Apply 1  Application topically 2 times a day. 45 g 0    methylPREDNISolone (Medrol Dospak) 4 mg tablets Take as directed on package. 21 tablet 0    mupirocin (Bactroban) 2 % ointment Apply topically 3 times a day for 10 days. apply to affected area 22 g 0     No current facility-administered medications for this visit.      Hypertension ROS: taking medications as instructed, no medication side effects noted, no TIA's, no chest pain on exertion, no dyspnea on exertion, no swelling of ankles, no orthostatic dizziness or lightheadedness, no orthopnea or paroxysmal nocturnal dyspnea, and no palpitations.   New concerns: none.     Lab Review  No visits with results within 2 Month(s) from this visit.   Latest known visit with results is:   Office Visit on 05/08/2024   Component Date Value    POC HEMOGLOBIN A1c 05/08/2024 6.5      Past Medical History:   Diagnosis Date    Acute lacunar infarction (Multi) 02/05/2024    Acute upper respiratory infection, unspecified 03/14/2015    Viral URI with cough    Anxiety disorder 02/05/2024    Comment on above: Added by Problem List Migration; 2013-12-10;    Class 1 obesity with body mass index (BMI) of 30.0 to 30.9 in adult 01/19/2023    Cough, unspecified 12/15/2014    Cough    Elevated blood-pressure reading, without diagnosis of hypertension 12/10/2013    Elevated blood pressure reading without diagnosis of hypertension    History of falling 03/04/2022    History of fall    Hypertension     Iron deficiency anemia secondary to inadequate dietary iron intake 01/19/2023    Osteoarthritis of right knee     Other specified health status 07/31/2014    Foreign travel    Personal history of other diseases of the respiratory system 12/15/2014    History of pharyngitis    Personal history of other diseases of urinary system 12/30/2021    History of renal insufficiency syndrome    Personal history of other endocrine, nutritional and metabolic disease 06/02/2021    History of obesity    Personal  "history of other infectious and parasitic diseases 07/31/2014    History of infectious diarrhea    Personal history of other specified conditions 07/31/2014    History of abdominal pain    Stage 3a chronic kidney disease (Multi) 01/19/2023    Tendonitis of wrist, right 01/19/2023    Type 2 diabetes mellitus with stage 3a chronic kidney disease, without long-term current use of insulin (Multi) 01/19/2023     Patient Active Problem List   Diagnosis    Arthralgia of multiple joints    Benign hypertension    Cerebrovascular small vessel disease    Polyp of colon    Steatosis of liver    Post menopausal syndrome    Hyperlipidemia associated with type 2 diabetes mellitus (Multi)    Chronic bilateral low back pain without sciatica    Cobalamin deficiency    Type 2 diabetes mellitus with hyperglycemia, without long-term current use of insulin (Multi)    Dermatitis due to drug reaction       Objective:   /80 (BP Location: Left arm, Patient Position: Sitting, BP Cuff Size: Adult)   Pulse 78   Resp 16   Ht 1.626 m (5' 4\")   Wt 67.6 kg (149 lb)   LMP 10/03/2008 (Approximate)   SpO2 98%   BMI 25.58 kg/m²        Review of Systems   Constitutional:  Negative for activity change, appetite change, chills, diaphoresis, fatigue, fever and unexpected weight change.   HENT:  Negative for congestion, dental problem, drooling, ear discharge, ear pain, facial swelling, hearing loss, nosebleeds, postnasal drip, rhinorrhea, sinus pressure, sinus pain, sneezing, sore throat, tinnitus, trouble swallowing and voice change.    Eyes:  Negative for pain, discharge, redness, itching and visual disturbance.   Respiratory:  Negative for cough, chest tightness, shortness of breath and wheezing.    Cardiovascular:  Negative for chest pain, palpitations and leg swelling.   Gastrointestinal:  Negative for abdominal pain, blood in stool, constipation, diarrhea, nausea and vomiting.   Endocrine: Negative for cold intolerance, heat intolerance, " "polydipsia, polyphagia and polyuria.   Genitourinary:  Negative for decreased urine volume, dysuria, flank pain, frequency, hematuria and urgency.   Musculoskeletal:  Negative for arthralgias, back pain, gait problem, joint swelling, myalgias and neck stiffness.   Skin:  Negative for color change, pallor, rash and wound.   Neurological:  Negative for dizziness.   Hematological:  Negative for adenopathy. Does not bruise/bleed easily.   Psychiatric/Behavioral:  Negative for agitation, behavioral problems and sleep disturbance. The patient is not nervous/anxious.        Objective   /80 (BP Location: Left arm, Patient Position: Sitting, BP Cuff Size: Adult)   Pulse 78   Resp 16   Ht 1.626 m (5' 4\")   Wt 67.6 kg (149 lb)   LMP 10/03/2008 (Approximate)   SpO2 98%   BMI 25.58 kg/m²   BSA: 1.75 meters squared  Growth percentiles: Facility age limit for growth %kimberlyn is 20 years. Facility age limit for growth %kimberlyn is 20 years.     Physical Exam  Vitals and nursing note reviewed.   Constitutional:       General: She is not in acute distress.     Appearance: Normal appearance. She is normal weight. She is not ill-appearing, toxic-appearing or diaphoretic.   HENT:      Head: Normocephalic.      Right Ear: Tympanic membrane, ear canal and external ear normal. There is no impacted cerumen.      Left Ear: Tympanic membrane, ear canal and external ear normal. There is no impacted cerumen.      Nose: Nose normal. No congestion or rhinorrhea.      Mouth/Throat:      Mouth: Mucous membranes are moist.      Pharynx: Oropharynx is clear. No oropharyngeal exudate or posterior oropharyngeal erythema.   Eyes:      General: No scleral icterus.        Right eye: No discharge.         Left eye: No discharge.      Extraocular Movements: Extraocular movements intact.      Conjunctiva/sclera: Conjunctivae normal.      Pupils: Pupils are equal, round, and reactive to light.   Neck:      Vascular: No carotid bruit.   Cardiovascular: "      Rate and Rhythm: Normal rate and regular rhythm.      Pulses: Normal pulses.      Heart sounds: Normal heart sounds. No murmur heard.     No friction rub. No gallop.   Pulmonary:      Effort: Pulmonary effort is normal. No respiratory distress.      Breath sounds: Normal breath sounds. No stridor. No wheezing, rhonchi or rales.   Chest:      Chest wall: No tenderness.   Abdominal:      General: Abdomen is flat. Bowel sounds are normal.      Palpations: Abdomen is soft. There is no mass.      Tenderness: There is no abdominal tenderness. There is no guarding or rebound.      Hernia: No hernia is present.   Musculoskeletal:         General: No swelling or tenderness. Normal range of motion.      Cervical back: Normal range of motion and neck supple. No rigidity or tenderness.      Right lower leg: No edema.      Left lower leg: No edema.   Lymphadenopathy:      Cervical: No cervical adenopathy.   Skin:     General: Skin is warm and dry.      Coloration: Skin is not pale.      Findings: No bruising, erythema or rash.   Neurological:      General: No focal deficit present.      Mental Status: She is alert and oriented to person, place, and time.      Sensory: No sensory deficit.      Motor: No weakness.      Coordination: Coordination normal.      Gait: Gait normal.      Deep Tendon Reflexes: Reflexes normal.   Psychiatric:         Mood and Affect: Mood normal.         Behavior: Behavior normal.         Thought Content: Thought content normal.         Judgment: Judgment normal.         Assessment/Plan   Problem List Items Addressed This Visit             ICD-10-CM    Benign hypertension I10     Stable on current medications  Continue meds and exercise.   Hypertension Plan  Continue current treatment regimen.  On listed meds for BP and doing well at this time.  Medication changes per orders.  Dietary sodium restriction.  Regular aerobic exercise.  Weight loss.  Reduce stress.  Patient Education: Reviewed risks of  hypertension and principles of   treatment.           Hyperlipidemia associated with type 2 diabetes mellitus (Multi) E11.69, E78.5     Take CoQ10 with Atorvastatin 20 mg at night         Relevant Medications    atorvastatin (Lipitor) 20 mg tablet    Cobalamin deficiency E53.8    Type 2 diabetes mellitus with hyperglycemia, without long-term current use of insulin (Multi) - Primary E11.65     Diabetes mellitus Type II, under good control.  Discussed general issues about diabetes pathophysiology and management.  Counseling at today's visit: discussed the need for weight loss, focused on the need for regular aerobic exercise, focused on the need to adhere to the prescribed ADA diet, discussed the advantages of a diet low in carbohydrates, and reminded to check sugars regularly and to bring readings in at the time of the next visit.  Educational material distributed.  Addressed ADA diet.  Suggested low cholesterol diet.  Encouraged aerobic exercise.           Relevant Orders    POCT glycosylated hemoglobin (Hb A1C) manually resulted (Completed)    Follow Up In [facility] - PCP - Established    Dermatitis due to drug reaction L27.0    Relevant Medications    methylPREDNISolone (Medrol Dospak) 4 mg tablets     Other Visit Diagnoses         Codes    Candida infection of flexural skin     B37.2    Relevant Medications    clotrimazole-betamethasone (Lotrisone) cream    Cellulitis of back     L03.312    Relevant Medications    mupirocin (Bactroban) 2 % ointment            Current Outpatient Medications   Medication Sig Dispense Refill    aspirin 81 mg EC tablet Take 1 tablet (81 mg) by mouth. Take every Mon, Wed & Fri      CALCIUM ORAL Take by mouth.      cholecalciferol (Vitamin D-3) 125 MCG (5000 UT) capsule Take 1 capsule (125 mcg) by mouth once daily.      lisinopril 20 mg tablet Take 1 tablet (20 mg) by mouth once daily. 90 tablet 1    magnesium oxide 500 mg magnesium tablet Take 1 tablet (500 mg) by mouth  once daily.      metFORMIN  mg 24 hr tablet Take 1 tablet (500 mg) by mouth once daily. 90 tablet 1    vitamin E acetate (VITAMIN E ORAL) Take 400 Units by mouth once daily.      atorvastatin (Lipitor) 20 mg tablet Take 1 tablet (20 mg) by mouth once daily.      clotrimazole-betamethasone (Lotrisone) cream Apply 1 Application topically 2 times a day. 45 g 0    methylPREDNISolone (Medrol Dospak) 4 mg tablets Take as directed on package. 21 tablet 0    mupirocin (Bactroban) 2 % ointment Apply topically 3 times a day for 10 days. apply to affected area 22 g 0     No current facility-administered medications for this visit.       Call me in 1 week if no improvement   Take CoQ10 with Atorvastatin 20 mg at night  Need to get Tdap at the pharmacy in November  Please get the RSV at the pharmacy in September    Continue current medications

## 2024-08-14 NOTE — ASSESSMENT & PLAN NOTE
Stable on current medications  Continue meds and exercise.   Hypertension Plan  Continue current treatment regimen.  On listed meds for BP and doing well at this time.  Medication changes per orders.  Dietary sodium restriction.  Regular aerobic exercise.  Weight loss.  Reduce stress.  Patient Education: Reviewed risks of hypertension and principles of   treatment.

## 2024-08-15 ASSESSMENT — ENCOUNTER SYMPTOMS: DIABETIC ASSOCIATED SYMPTOMS: 0

## 2024-09-05 DIAGNOSIS — I10 BENIGN HYPERTENSION: ICD-10-CM

## 2024-09-05 RX ORDER — LISINOPRIL 20 MG/1
20 TABLET ORAL DAILY
Qty: 90 TABLET | Refills: 1 | Status: SHIPPED | OUTPATIENT
Start: 2024-09-05

## 2024-09-14 DIAGNOSIS — E11.65 TYPE 2 DIABETES MELLITUS WITH HYPERGLYCEMIA, WITHOUT LONG-TERM CURRENT USE OF INSULIN: ICD-10-CM

## 2024-09-16 RX ORDER — METFORMIN HYDROCHLORIDE 500 MG/1
500 TABLET, EXTENDED RELEASE ORAL DAILY
Qty: 90 TABLET | Refills: 1 | Status: SHIPPED | OUTPATIENT
Start: 2024-09-16

## 2024-10-23 ENCOUNTER — PATIENT MESSAGE (OUTPATIENT)
Dept: PRIMARY CARE | Facility: CLINIC | Age: 69
End: 2024-10-23
Payer: MEDICARE

## 2024-10-23 DIAGNOSIS — I10 BENIGN HYPERTENSION: Primary | ICD-10-CM

## 2024-10-23 RX ORDER — AMLODIPINE BESYLATE 5 MG/1
5 TABLET ORAL DAILY
Qty: 90 TABLET | Refills: 1 | Status: SHIPPED | OUTPATIENT
Start: 2024-10-23

## 2024-11-18 ENCOUNTER — APPOINTMENT (OUTPATIENT)
Dept: PRIMARY CARE | Facility: CLINIC | Age: 69
End: 2024-11-18
Payer: MEDICARE

## 2024-11-18 VITALS
BODY MASS INDEX: 26.15 KG/M2 | TEMPERATURE: 97.9 F | DIASTOLIC BLOOD PRESSURE: 70 MMHG | WEIGHT: 153.2 LBS | HEART RATE: 69 BPM | OXYGEN SATURATION: 100 % | SYSTOLIC BLOOD PRESSURE: 120 MMHG | HEIGHT: 64 IN

## 2024-11-18 DIAGNOSIS — Z23 NEED FOR IMMUNIZATION AGAINST INFLUENZA: ICD-10-CM

## 2024-11-18 DIAGNOSIS — Z23 NEED FOR VACCINATION: ICD-10-CM

## 2024-11-18 DIAGNOSIS — E11.69 HYPERLIPIDEMIA ASSOCIATED WITH TYPE 2 DIABETES MELLITUS (MULTI): ICD-10-CM

## 2024-11-18 DIAGNOSIS — I10 BENIGN HYPERTENSION: Primary | ICD-10-CM

## 2024-11-18 DIAGNOSIS — E11.65 TYPE 2 DIABETES MELLITUS WITH HYPERGLYCEMIA, WITHOUT LONG-TERM CURRENT USE OF INSULIN: ICD-10-CM

## 2024-11-18 DIAGNOSIS — E78.5 HYPERLIPIDEMIA ASSOCIATED WITH TYPE 2 DIABETES MELLITUS (MULTI): ICD-10-CM

## 2024-11-18 PROBLEM — L27.0 DERMATITIS DUE TO DRUG REACTION: Status: RESOLVED | Noted: 2024-08-14 | Resolved: 2024-11-18

## 2024-11-18 LAB — POC HEMOGLOBIN A1C: 6 % (ref 4.2–6.5)

## 2024-11-18 PROCEDURE — 3044F HG A1C LEVEL LT 7.0%: CPT | Performed by: FAMILY MEDICINE

## 2024-11-18 PROCEDURE — 3008F BODY MASS INDEX DOCD: CPT | Performed by: FAMILY MEDICINE

## 2024-11-18 PROCEDURE — G0008 ADMIN INFLUENZA VIRUS VAC: HCPCS | Performed by: FAMILY MEDICINE

## 2024-11-18 PROCEDURE — 3074F SYST BP LT 130 MM HG: CPT | Performed by: FAMILY MEDICINE

## 2024-11-18 PROCEDURE — 1036F TOBACCO NON-USER: CPT | Performed by: FAMILY MEDICINE

## 2024-11-18 PROCEDURE — 4010F ACE/ARB THERAPY RXD/TAKEN: CPT | Performed by: FAMILY MEDICINE

## 2024-11-18 PROCEDURE — 2028F FOOT EXAM PERFORMED: CPT | Performed by: FAMILY MEDICINE

## 2024-11-18 PROCEDURE — 99214 OFFICE O/P EST MOD 30 MIN: CPT | Performed by: FAMILY MEDICINE

## 2024-11-18 PROCEDURE — 3078F DIAST BP <80 MM HG: CPT | Performed by: FAMILY MEDICINE

## 2024-11-18 PROCEDURE — 1160F RVW MEDS BY RX/DR IN RCRD: CPT | Performed by: FAMILY MEDICINE

## 2024-11-18 PROCEDURE — 83036 HEMOGLOBIN GLYCOSYLATED A1C: CPT | Performed by: FAMILY MEDICINE

## 2024-11-18 PROCEDURE — 1159F MED LIST DOCD IN RCRD: CPT | Performed by: FAMILY MEDICINE

## 2024-11-18 PROCEDURE — 1123F ACP DISCUSS/DSCN MKR DOCD: CPT | Performed by: FAMILY MEDICINE

## 2024-11-18 PROCEDURE — 3048F LDL-C <100 MG/DL: CPT | Performed by: FAMILY MEDICINE

## 2024-11-18 PROCEDURE — 90662 IIV NO PRSV INCREASED AG IM: CPT | Performed by: FAMILY MEDICINE

## 2024-11-18 ASSESSMENT — ENCOUNTER SYMPTOMS
APPETITE CHANGE: 0
DYSURIA: 0
FEVER: 0
DIAPHORESIS: 0
EYE PAIN: 0
DIARRHEA: 0
POLYDIPSIA: 0
FACIAL SWELLING: 0
VOICE CHANGE: 0
MYALGIAS: 0
CHEST TIGHTNESS: 0
RHINORRHEA: 0
BRUISES/BLEEDS EASILY: 0
AGITATION: 0
NERVOUS/ANXIOUS: 0
CHILLS: 0
NAUSEA: 0
FATIGUE: 0
PALPITATIONS: 0
UNEXPECTED WEIGHT CHANGE: 0
OCCASIONAL FEELINGS OF UNSTEADINESS: 0
TROUBLE SWALLOWING: 0
COLOR CHANGE: 0
SLEEP DISTURBANCE: 0
BLOOD IN STOOL: 0
JOINT SWELLING: 0
LOSS OF SENSATION IN FEET: 0
POLYPHAGIA: 0
SINUS PAIN: 0
WHEEZING: 0
EYE ITCHING: 0
ARTHRALGIAS: 0
VOMITING: 0
DEPRESSION: 0
SHORTNESS OF BREATH: 0
EYE REDNESS: 0
BACK PAIN: 0
FREQUENCY: 0
WOUND: 0
SORE THROAT: 0
CONSTIPATION: 0
COUGH: 0
ACTIVITY CHANGE: 0
ADENOPATHY: 0
SINUS PRESSURE: 0
EYE DISCHARGE: 0
FLANK PAIN: 0
HEMATURIA: 0
NECK STIFFNESS: 0
DIZZINESS: 0
ABDOMINAL PAIN: 0

## 2024-11-18 ASSESSMENT — PATIENT HEALTH QUESTIONNAIRE - PHQ9
SUM OF ALL RESPONSES TO PHQ9 QUESTIONS 1 AND 2: 0
2. FEELING DOWN, DEPRESSED OR HOPELESS: NOT AT ALL
1. LITTLE INTEREST OR PLEASURE IN DOING THINGS: NOT AT ALL

## 2024-11-18 NOTE — PATIENT INSTRUCTIONS
A1c today  Specialty Consultation notes reviewed  Labs reviewed  Refills sent in  F/U in 3 months for HTN and Diabetes  Continue current medications  Call if any new concerns  Please get the RSV and TDAP vaccine at the pharmacy  Please get you eye exam

## 2024-11-18 NOTE — ASSESSMENT & PLAN NOTE
you have high cholesterol. (hyperlipidemia). This increases your risk for cardiovascular disease.(Heart attack/stroke/circulation);  Continue any prescribed and advised OTC medications, in addition, as reminder:;   For high LDL (>130) use blueberries 1 cup daily;   Plant Stanol dose 950mg twice daily (may need to find online source for this dose), and ;    Metamucil/psyllium fiber 7 grams daily.;  ;  For high Triglycerides:;   Fish oil (start at 2000-3000mg daily);  ;   You should exercise 30 minutes daily. ;  ;   Your nutrition plan needs to emphasizes vegetables, fruits, and lean meat. I recommend the Mediterranean Diet ;  Please find this link to helpful information about lowering your cholesterol: http://www.aafp.org/afp/2010/0501/p1103.html;

## 2024-11-18 NOTE — PROGRESS NOTES
Subjective   Patient ID: Grecia Pineda is a 69 y.o. female who presents for Follow-up (Patient is here DM follow up).  Today she is accompanied by alone.     HPI  Subjective:   Grecia Pineda is a 69 y.o. female with hypertension.  Current Outpatient Medications   Medication Sig Dispense Refill    amLODIPine (Norvasc) 5 mg tablet Take 1 tablet (5 mg) by mouth once daily. 90 tablet 1    aspirin 81 mg EC tablet Take 1 tablet (81 mg) by mouth. Take every Mon, Wed & Fri      atorvastatin (Lipitor) 20 mg tablet Take 1 tablet (20 mg) by mouth once daily.      CALCIUM ORAL Take by mouth.      cholecalciferol (Vitamin D-3) 125 MCG (5000 UT) capsule Take 1 capsule (125 mcg) by mouth once daily.      clotrimazole-betamethasone (Lotrisone) cream Apply 1 Application topically 2 times a day. 45 g 0    lisinopril 20 mg tablet TAKE 1 TABLET BY MOUTH EVERY DAY 90 tablet 1    magnesium oxide 500 mg magnesium tablet Take 1 tablet (500 mg) by mouth once daily.      metFORMIN  mg 24 hr tablet TAKE 1 TABLET BY MOUTH EVERY DAY 90 tablet 1    vitamin E acetate (VITAMIN E ORAL) Take 400 Units by mouth once daily.       No current facility-administered medications for this visit.      Hypertension ROS: taking medications as instructed, no medication side effects noted, no TIA's, no chest pain on exertion, no dyspnea on exertion, no swelling of ankles, no orthostatic dizziness or lightheadedness, and no orthopnea or paroxysmal nocturnal dyspnea.   Diabetes  She presents for her follow-up diabetic visit. She has type 2 diabetes mellitus. No MedicAlert identification noted. There are no hypoglycemic associated symptoms. Pertinent negatives for hypoglycemia include no dizziness, nervousness/anxiousness or pallor. There are no diabetic associated symptoms. Pertinent negatives for diabetes include no chest pain, no fatigue, no polydipsia, no polyphagia and no polyuria. There are no hypoglycemic complications. There are no diabetic  complications. There are no known risk factors for coronary artery disease. Current diabetic treatment includes oral agent (monotherapy). She is compliant with treatment all of the time. Her weight is stable. She is following a generally healthy diet. Meal planning includes avoidance of concentrated sweets. She has not had a previous visit with a dietitian. An ACE inhibitor/angiotensin II receptor blocker is being taken. She does not see a podiatrist.Eye exam is current.   Objective:       Review of Systems   Constitutional:  Negative for activity change, appetite change, chills, diaphoresis, fatigue, fever and unexpected weight change.   HENT:  Negative for congestion, dental problem, drooling, ear discharge, ear pain, facial swelling, hearing loss, nosebleeds, postnasal drip, rhinorrhea, sinus pressure, sinus pain, sneezing, sore throat, tinnitus, trouble swallowing and voice change.    Eyes:  Negative for pain, discharge, redness, itching and visual disturbance.   Respiratory:  Negative for cough, chest tightness, shortness of breath and wheezing.    Cardiovascular:  Negative for chest pain, palpitations and leg swelling.   Gastrointestinal:  Negative for abdominal pain, blood in stool, constipation, diarrhea, nausea and vomiting.   Endocrine: Negative for cold intolerance, heat intolerance, polydipsia, polyphagia and polyuria.   Genitourinary:  Negative for decreased urine volume, dysuria, flank pain, frequency, hematuria and urgency.   Musculoskeletal:  Negative for arthralgias, back pain, gait problem, joint swelling, myalgias and neck stiffness.   Skin:  Negative for color change, pallor, rash and wound.   Neurological:  Negative for dizziness.   Hematological:  Negative for adenopathy. Does not bruise/bleed easily.   Psychiatric/Behavioral:  Negative for agitation, behavioral problems and sleep disturbance. The patient is not nervous/anxious.        Objective   /70   Pulse 69   Temp 36.6 °C (97.9 °F)  "(Oral)   Ht 1.626 m (5' 4\")   Wt 69.5 kg (153 lb 3.2 oz)   LMP 10/03/2008 (Approximate)   SpO2 100%   BMI 26.30 kg/m²     Physical Exam  Vitals and nursing note reviewed.   Constitutional:       General: She is not in acute distress.     Appearance: Normal appearance. She is not ill-appearing, toxic-appearing or diaphoretic.   HENT:      Head: Normocephalic.      Right Ear: Tympanic membrane, ear canal and external ear normal.      Left Ear: Tympanic membrane, ear canal and external ear normal.      Nose: Nose normal. No congestion or rhinorrhea.      Mouth/Throat:      Mouth: Mucous membranes are moist.      Pharynx: Oropharynx is clear. No oropharyngeal exudate or posterior oropharyngeal erythema.   Eyes:      General: No scleral icterus.        Right eye: No discharge.         Left eye: No discharge.      Extraocular Movements: Extraocular movements intact.      Conjunctiva/sclera: Conjunctivae normal.      Pupils: Pupils are equal, round, and reactive to light.   Neck:      Vascular: No carotid bruit.   Cardiovascular:      Rate and Rhythm: Normal rate and regular rhythm.      Pulses: Normal pulses.      Heart sounds: Normal heart sounds. No murmur heard.     No friction rub. No gallop.   Pulmonary:      Effort: Pulmonary effort is normal. No respiratory distress.      Breath sounds: Normal breath sounds. No stridor. No wheezing, rhonchi or rales.   Chest:      Chest wall: No tenderness.   Musculoskeletal:         General: Normal range of motion.      Cervical back: Normal range of motion and neck supple. No rigidity or tenderness.      Right lower leg: No edema.      Left lower leg: No edema.   Lymphadenopathy:      Cervical: No cervical adenopathy.   Skin:     General: Skin is warm and dry.   Neurological:      General: No focal deficit present.      Mental Status: She is alert and oriented to person, place, and time.      Sensory: No sensory deficit.      Motor: No weakness.      Coordination: " Coordination normal.   Psychiatric:         Mood and Affect: Mood normal.         Behavior: Behavior normal.         Thought Content: Thought content normal.         Judgment: Judgment normal.         Assessment/Plan   Problem List Items Addressed This Visit             ICD-10-CM    Benign hypertension - Primary I10     Stable on current medications  Continue meds and exercise.   Hypertension Plan  Continue current treatment regimen.  On listed meds for BP and doing well at this time.  Medication changes per orders.  Dietary sodium restriction.  Regular aerobic exercise.  Weight loss.  Reduce stress.  Patient Education: Reviewed risks of hypertension and principles of   treatment.           Hyperlipidemia associated with type 2 diabetes mellitus (Multi) E11.69, E78.5     you have high cholesterol. (hyperlipidemia). This increases your risk for cardiovascular disease.(Heart attack/stroke/circulation);  Continue any prescribed and advised OTC medications, in addition, as reminder:;   For high LDL (>130) use blueberries 1 cup daily;   Plant Stanol dose 950mg twice daily (may need to find online source for this dose), and ;    Metamucil/psyllium fiber 7 grams daily.;  ;  For high Triglycerides:;   Fish oil (start at 2000-3000mg daily);  ;   You should exercise 30 minutes daily. ;  ;   Your nutrition plan needs to emphasizes vegetables, fruits, and lean meat. I recommend the Mediterranean Diet ;  Please find this link to helpful information about lowering your cholesterol: http://www.aafp.org/afp/[email or web address]/0501/[email or web address].html;           Type 2 diabetes mellitus with hyperglycemia, without long-term current use of insulin E11.65     Diabetes mellitus Type II, under good control.  Discussed general issues about diabetes pathophysiology and management.  Counseling at today's visit: discussed the need for weight loss, focused on the need for regular aerobic exercise, focused on the need to adhere to the prescribed ADA diet, discussed  the advantages of a diet low in carbohydrates, and reminded to check sugars regularly and to bring readings in at the time of the next visit.  Educational material distributed.  Addressed ADA diet.  Suggested low cholesterol diet.  Encouraged aerobic exercise.           Relevant Orders    POCT glycosylated hemoglobin (Hb A1C) manually resulted (Completed)     Other Visit Diagnoses         Codes    Need for vaccination     Z23    Need for immunization against influenza     Z23    Relevant Orders    Flu vaccine, trivalent, preservative free, HIGH-DOSE, age 65y+ (Fluzone)          Current Outpatient Medications   Medication Sig Dispense Refill    amLODIPine (Norvasc) 5 mg tablet Take 1 tablet (5 mg) by mouth once daily. 90 tablet 1    aspirin 81 mg EC tablet Take 1 tablet (81 mg) by mouth. Take every Mon, Wed & Fri      atorvastatin (Lipitor) 20 mg tablet Take 1 tablet (20 mg) by mouth once daily.      CALCIUM ORAL Take by mouth.      cholecalciferol (Vitamin D-3) 125 MCG (5000 UT) capsule Take 1 capsule (125 mcg) by mouth once daily.      clotrimazole-betamethasone (Lotrisone) cream Apply 1 Application topically 2 times a day. 45 g 0    lisinopril 20 mg tablet TAKE 1 TABLET BY MOUTH EVERY DAY 90 tablet 1    magnesium oxide 500 mg magnesium tablet Take 1 tablet (500 mg) by mouth once daily.      metFORMIN  mg 24 hr tablet TAKE 1 TABLET BY MOUTH EVERY DAY 90 tablet 1    vitamin E acetate (VITAMIN E ORAL) Take 400 Units by mouth once daily.       No current facility-administered medications for this visit.       A1c today 6.0%  Specialty Consultation notes reviewed  Labs reviewed  Flu vaccine today  Refills when needed  F/U in 3 months for HTN and Diabetes and Medicare Wellness  Continue current medications  Call if any new concerns  Please get the RSV vaccine at the pharmacy  Please get you eye exam

## 2025-02-19 ENCOUNTER — TELEPHONE (OUTPATIENT)
Dept: PRIMARY CARE | Facility: CLINIC | Age: 70
End: 2025-02-19
Payer: MEDICARE

## 2025-02-19 NOTE — TELEPHONE ENCOUNTER
Pt called to reschedule her annual PE that was sched. 2/26 due to daughter having baby. Pt rescheduled the PE to 4/1/25. Pt inquiring if the labs she completed on 1/31/25 would need to be updated or not for that April appointment please advise thank you    Pt phone: [phone]

## 2025-02-26 ENCOUNTER — APPOINTMENT (OUTPATIENT)
Dept: PRIMARY CARE | Facility: CLINIC | Age: 70
End: 2025-02-26
Payer: MEDICARE

## 2025-03-05 DIAGNOSIS — I10 BENIGN HYPERTENSION: ICD-10-CM

## 2025-03-05 DIAGNOSIS — E11.65 TYPE 2 DIABETES MELLITUS WITH HYPERGLYCEMIA, WITHOUT LONG-TERM CURRENT USE OF INSULIN: ICD-10-CM

## 2025-03-05 RX ORDER — LISINOPRIL 20 MG/1
20 TABLET ORAL DAILY
Qty: 90 TABLET | Refills: 0 | Status: SHIPPED | OUTPATIENT
Start: 2025-03-05

## 2025-03-05 RX ORDER — METFORMIN HYDROCHLORIDE 500 MG/1
500 TABLET, EXTENDED RELEASE ORAL DAILY
Qty: 90 TABLET | Refills: 0 | Status: SHIPPED | OUTPATIENT
Start: 2025-03-05

## 2025-04-01 ENCOUNTER — APPOINTMENT (OUTPATIENT)
Dept: PRIMARY CARE | Facility: CLINIC | Age: 70
End: 2025-04-01
Payer: MEDICARE

## 2025-04-01 VITALS
TEMPERATURE: 97.9 F | HEIGHT: 63 IN | OXYGEN SATURATION: 98 % | BODY MASS INDEX: 27.68 KG/M2 | DIASTOLIC BLOOD PRESSURE: 79 MMHG | WEIGHT: 156.2 LBS | HEART RATE: 72 BPM | SYSTOLIC BLOOD PRESSURE: 117 MMHG

## 2025-04-01 DIAGNOSIS — E53.8 VITAMIN B 12 DEFICIENCY: ICD-10-CM

## 2025-04-01 DIAGNOSIS — E55.9 VITAMIN D DEFICIENCY: ICD-10-CM

## 2025-04-01 DIAGNOSIS — Z13.220 SCREENING FOR HYPERLIPIDEMIA: ICD-10-CM

## 2025-04-01 DIAGNOSIS — Z00.00 ROUTINE GENERAL MEDICAL EXAMINATION AT HEALTH CARE FACILITY: Primary | ICD-10-CM

## 2025-04-01 DIAGNOSIS — E11.65 TYPE 2 DIABETES MELLITUS WITH HYPERGLYCEMIA, WITHOUT LONG-TERM CURRENT USE OF INSULIN: ICD-10-CM

## 2025-04-01 DIAGNOSIS — E11.69 HYPERLIPIDEMIA ASSOCIATED WITH TYPE 2 DIABETES MELLITUS: ICD-10-CM

## 2025-04-01 DIAGNOSIS — Z78.0 ASYMPTOMATIC MENOPAUSAL STATE: ICD-10-CM

## 2025-04-01 DIAGNOSIS — I10 BENIGN HYPERTENSION: ICD-10-CM

## 2025-04-01 DIAGNOSIS — E78.5 HYPERLIPIDEMIA ASSOCIATED WITH TYPE 2 DIABETES MELLITUS: ICD-10-CM

## 2025-04-01 DIAGNOSIS — Z71.89 CARDIAC RISK COUNSELING: ICD-10-CM

## 2025-04-01 DIAGNOSIS — Z13.1 DIABETES MELLITUS SCREENING: ICD-10-CM

## 2025-04-01 DIAGNOSIS — Z13.6 SCREENING FOR CARDIOVASCULAR CONDITION: ICD-10-CM

## 2025-04-01 DIAGNOSIS — Z12.31 ENCOUNTER FOR SCREENING MAMMOGRAM FOR BREAST CANCER: ICD-10-CM

## 2025-04-01 LAB — POC HEMOGLOBIN A1C: 6.3 % (ref 4.2–6.5)

## 2025-04-01 PROCEDURE — G0444 DEPRESSION SCREEN ANNUAL: HCPCS | Performed by: FAMILY MEDICINE

## 2025-04-01 PROCEDURE — 1158F ADVNC CARE PLAN TLK DOCD: CPT | Performed by: FAMILY MEDICINE

## 2025-04-01 PROCEDURE — 3008F BODY MASS INDEX DOCD: CPT | Performed by: FAMILY MEDICINE

## 2025-04-01 PROCEDURE — G0446 INTENS BEHAVE THER CARDIO DX: HCPCS | Performed by: FAMILY MEDICINE

## 2025-04-01 PROCEDURE — 3078F DIAST BP <80 MM HG: CPT | Performed by: FAMILY MEDICINE

## 2025-04-01 PROCEDURE — G0442 ANNUAL ALCOHOL SCREEN 15 MIN: HCPCS | Performed by: FAMILY MEDICINE

## 2025-04-01 PROCEDURE — 4010F ACE/ARB THERAPY RXD/TAKEN: CPT | Performed by: FAMILY MEDICINE

## 2025-04-01 PROCEDURE — 1170F FXNL STATUS ASSESSED: CPT | Performed by: FAMILY MEDICINE

## 2025-04-01 PROCEDURE — 1160F RVW MEDS BY RX/DR IN RCRD: CPT | Performed by: FAMILY MEDICINE

## 2025-04-01 PROCEDURE — 1123F ACP DISCUSS/DSCN MKR DOCD: CPT | Performed by: FAMILY MEDICINE

## 2025-04-01 PROCEDURE — 99497 ADVNCD CARE PLAN 30 MIN: CPT | Performed by: FAMILY MEDICINE

## 2025-04-01 PROCEDURE — G0439 PPPS, SUBSEQ VISIT: HCPCS | Performed by: FAMILY MEDICINE

## 2025-04-01 PROCEDURE — 1159F MED LIST DOCD IN RCRD: CPT | Performed by: FAMILY MEDICINE

## 2025-04-01 PROCEDURE — 83036 HEMOGLOBIN GLYCOSYLATED A1C: CPT | Performed by: FAMILY MEDICINE

## 2025-04-01 PROCEDURE — 3074F SYST BP LT 130 MM HG: CPT | Performed by: FAMILY MEDICINE

## 2025-04-01 PROCEDURE — 99397 PER PM REEVAL EST PAT 65+ YR: CPT | Performed by: FAMILY MEDICINE

## 2025-04-01 RX ORDER — ROSUVASTATIN CALCIUM 5 MG/1
5 TABLET, COATED ORAL DAILY
Qty: 90 TABLET | Refills: 0 | Status: SHIPPED | OUTPATIENT
Start: 2025-04-01

## 2025-04-01 RX ORDER — AMLODIPINE BESYLATE 5 MG/1
5 TABLET ORAL DAILY
Qty: 90 TABLET | Refills: 1 | Status: SHIPPED | OUTPATIENT
Start: 2025-04-01

## 2025-04-01 SDOH — ECONOMIC STABILITY: FOOD INSECURITY: WITHIN THE PAST 12 MONTHS, YOU WORRIED THAT YOUR FOOD WOULD RUN OUT BEFORE YOU GOT MONEY TO BUY MORE.: NEVER TRUE

## 2025-04-01 SDOH — ECONOMIC STABILITY: INCOME INSECURITY: IN THE LAST 12 MONTHS, WAS THERE A TIME WHEN YOU WERE NOT ABLE TO PAY THE MORTGAGE OR RENT ON TIME?: NO

## 2025-04-01 SDOH — ECONOMIC STABILITY: FOOD INSECURITY: WITHIN THE PAST 12 MONTHS, THE FOOD YOU BOUGHT JUST DIDN'T LAST AND YOU DIDN'T HAVE MONEY TO GET MORE.: NEVER TRUE

## 2025-04-01 SDOH — ECONOMIC STABILITY: TRANSPORTATION INSECURITY
IN THE PAST 12 MONTHS, HAS LACK OF TRANSPORTATION KEPT YOU FROM MEETINGS, WORK, OR FROM GETTING THINGS NEEDED FOR DAILY LIVING?: NO

## 2025-04-01 SDOH — ECONOMIC STABILITY: TRANSPORTATION INSECURITY
IN THE PAST 12 MONTHS, HAS THE LACK OF TRANSPORTATION KEPT YOU FROM MEDICAL APPOINTMENTS OR FROM GETTING MEDICATIONS?: NO

## 2025-04-01 SDOH — HEALTH STABILITY: PHYSICAL HEALTH: ON AVERAGE, HOW MANY DAYS PER WEEK DO YOU ENGAGE IN MODERATE TO STRENUOUS EXERCISE (LIKE A BRISK WALK)?: 6 DAYS

## 2025-04-01 SDOH — HEALTH STABILITY: PHYSICAL HEALTH: ON AVERAGE, HOW MANY MINUTES DO YOU ENGAGE IN EXERCISE AT THIS LEVEL?: 50 MIN

## 2025-04-01 ASSESSMENT — ACTIVITIES OF DAILY LIVING (ADL)
DOING_HOUSEWORK: INDEPENDENT
BATHING: INDEPENDENT
TAKING_MEDICATION: INDEPENDENT
DRESSING: INDEPENDENT
GROCERY_SHOPPING: INDEPENDENT
MANAGING_FINANCES: INDEPENDENT

## 2025-04-01 ASSESSMENT — LIFESTYLE VARIABLES
HOW MANY STANDARD DRINKS CONTAINING ALCOHOL DO YOU HAVE ON A TYPICAL DAY: 1 OR 2
HOW OFTEN DO YOU HAVE SIX OR MORE DRINKS ON ONE OCCASION: NEVER
SKIP TO QUESTIONS 9-10: 1
HOW OFTEN DO YOU HAVE A DRINK CONTAINING ALCOHOL: NEVER
AUDIT-C TOTAL SCORE: 1
AUDIT-C TOTAL SCORE: 0
SKIP TO QUESTIONS 9-10: 1
HOW MANY STANDARD DRINKS CONTAINING ALCOHOL DO YOU HAVE ON A TYPICAL DAY: PATIENT DOES NOT DRINK
HOW OFTEN DO YOU HAVE SIX OR MORE DRINKS ON ONE OCCASION: NEVER
HOW OFTEN DO YOU HAVE A DRINK CONTAINING ALCOHOL: MONTHLY OR LESS

## 2025-04-01 ASSESSMENT — MINI MENTAL STATE EXAM
SAY: I WOULD LIKE YOU TO REPEAT THIS PHRASE AFTER ME: NO IFS, ANDS, OR BUTS.: 1 CORRECT
SAY:  READ THE WORDS ON THE PAGE AND THEN DO WHAT IT SAYS.  THEN HAND THE PERSON THE SHEET WITH CLOSE YOUR EYES ON IT.  IF THE SUBJECT READS AND DOES NOT CLOSE THEIR EYES, REPEAT UP TO THREE TIMES.  SCORE ONLY IF SUBJECT CLOSES EYES.: 3 CORRECT
WHAT STATE, COUNTRY, CITY, HOSPITAL, FLOOR: 5 CORRECT
PLACE DESIGN, ERASER AND PENCIL IN FRONT OF THE PERSON.  SAY:  COPY THIS DESIGN PLEASE.  SHOW: DESIGN. ALLOW: MULTIPLE TRIES. WAIT UNTIL PERSON IS FINISHED AND HANDS IT BACK. SCORE: ONLY FOR DIAGRAM WITH 4-SIDED FIGURE BETWEEN TWO 5-SIDED FIGURES: 1 CORRECT
NAME OR REPEAT 3 OBJECTS - (APPLE, TABLE, PENNY) OR (BALL, TREE, FLAG): 3 CORRECT
RECALL THE 3 OBJECTS FROM ABOVE (APPLE, TABLE, PENNY) OR (BALL, TREE, FLAG): 3 CORRECT
SPELL THE WORD WORLD FORWARD AND BACKWARDS OR SERIAL 7S: 5 CORRECT
SHOW: PENCIL [OBJECT] ASK: WHAT IS THIS CALLED?: 2 CORRECT
HAND THE PERSON A PENCIL AND PAPER. SAY:  WRITE ANY COMPLETE SENTENCE ON THAT PIECE OF PAPER. (NOTE: THE SENTENCE MUST MAKE SENSE.  IGNORE SPELLING ERRORS): 1 CORRECT
WHAT IS THE YEAR, SEASON, DATE, DAY, AND MONTH: 5 CORRECT
PLEASE COPY THIS PICTURE (NOTE ALL 10 ANGLES MUST BE PRESENT AND TWO MUST INTERSECT): 1 CORRECT
SUM ALL MMSE QUESTIONS FOR TOTAL SCORE [OUT OF 30].: 30

## 2025-04-01 ASSESSMENT — ENCOUNTER SYMPTOMS
LOSS OF SENSATION IN FEET: 0
OCCASIONAL FEELINGS OF UNSTEADINESS: 0
DEPRESSION: 0

## 2025-04-01 ASSESSMENT — SOCIAL DETERMINANTS OF HEALTH (SDOH)
WITHIN THE LAST YEAR, HAVE TO BEEN RAPED OR FORCED TO HAVE ANY KIND OF SEXUAL ACTIVITY BY YOUR PARTNER OR EX-PARTNER?: NO
WITHIN THE LAST YEAR, HAVE YOU BEEN KICKED, HIT, SLAPPED, OR OTHERWISE PHYSICALLY HURT BY YOUR PARTNER OR EX-PARTNER?: NO
IN THE PAST 12 MONTHS, HAS THE ELECTRIC, GAS, OIL, OR WATER COMPANY THREATENED TO SHUT OFF SERVICE IN YOUR HOME?: NO
WITHIN THE LAST YEAR, HAVE YOU BEEN HUMILIATED OR EMOTIONALLY ABUSED IN OTHER WAYS BY YOUR PARTNER OR EX-PARTNER?: NO
HOW HARD IS IT FOR YOU TO PAY FOR THE VERY BASICS LIKE FOOD, HOUSING, MEDICAL CARE, AND HEATING?: NOT HARD AT ALL
WITHIN THE LAST YEAR, HAVE YOU BEEN AFRAID OF YOUR PARTNER OR EX-PARTNER?: NO

## 2025-04-01 NOTE — RESULT ENCOUNTER NOTE
6.3 slightly worse and needs to work on her diet and will need to consider change in medication next visit if A1c increases or does not improve

## 2025-04-01 NOTE — PROGRESS NOTES
Subjective   Reason for Visit: Grecia Pineda is an 70 y.o. female here for a Medicare Wellness visit.     Past Medical, Surgical, and Family History reviewed and updated in chart.    Reviewed all medications by prescribing practitioner or clinical pharmacist (such as prescriptions, OTCs, herbal therapies and supplements) and documented in the medical record.    HPI    Advance Care Planning Note     Discussion Date: 04/01/25   Discussion Participants: patient    The patient wishes to discuss Advance Care Planning today and the following is a brief summary of our discussion.     Patient has capacity to make their own medical decisions: Yes  Health Care Agent/Surrogate Decision Maker documented in chart: Yes    Documents on file and valid:  Advance Directive/Living Will: No   Health Care Power of : No  Other: code status discussed    Communication of Medical Status/Prognosis:   good     Communication of Treatment Goals/Options:   good     Treatment Decisions  Goals of Care: survival is prioritized, if goals for quality or survival can reasonably be achieved   agree  Follow Up Plan  To bring in POA and Living will  Team Members  PCP  Time Statement: Total face to face time spent on advance care planning was 16 minutes with 16 minutes spent in counseling, including the explanation.    Melva Cobb MD  4/1/2025 12:00 PM  Patient Care Team:  Melva Cobb MD as PCP - General  Melva Cobb MD as PCP - [employer] PCP   Patient Care Team:  Melva Cobb MD as PCP - General  Melva Cobb MD as PCP - [employer] Advantage PCP     Past Medical History:   Diagnosis Date    Acute lacunar infarction (Multi) 02/05/2024    Acute upper respiratory infection, unspecified 03/14/2015    Viral URI with cough    Anxiety disorder 02/05/2024    Comment on above: Added by Problem List Migration; 2013-12-10;    Class 1 obesity with body mass index (BMI) of 30.0 to 30.9 in adult 01/19/2023    Cough, unspecified  12/15/2014    Cough    Dermatitis due to drug reaction 08/14/2024    Elevated blood-pressure reading, without diagnosis of hypertension 12/10/2013    Elevated blood pressure reading without diagnosis of hypertension    History of falling 03/04/2022    History of fall    Hypertension     Iron deficiency anemia secondary to inadequate dietary iron intake 01/19/2023    Osteoarthritis of right knee     Other specified health status 07/31/2014    Foreign travel    Personal history of other diseases of the respiratory system 12/15/2014    History of pharyngitis    Personal history of other diseases of urinary system 12/30/2021    History of renal insufficiency syndrome    Personal history of other endocrine, nutritional and metabolic disease 06/02/2021    History of obesity    Personal history of other infectious and parasitic diseases 07/31/2014    History of infectious diarrhea    Personal history of other specified conditions 07/31/2014    History of abdominal pain    Stage 3a chronic kidney disease (Multi) 01/19/2023    Tendonitis of wrist, right 01/19/2023    Type 2 diabetes mellitus with stage 3a chronic kidney disease, without long-term current use of insulin (Multi) 01/19/2023     Social History     Socioeconomic History    Marital status:      Spouse name: Not on file    Number of children: Not on file    Years of education: Not on file    Highest education level: Not on file   Occupational History    Not on file   Tobacco Use    Smoking status: Never     Passive exposure: Never    Smokeless tobacco: Never   Vaping Use    Vaping status: Never Used   Substance and Sexual Activity    Alcohol use: Yes     Comment: maybe twice a month    Drug use: Never    Sexual activity: Not on file   Other Topics Concern    Not on file   Social History Narrative    Not on file     Social Drivers of Health     Financial Resource Strain: Low Risk  (4/1/2025)    Overall Financial Resource Strain (CARDIA)     Difficulty of  Paying Living Expenses: Not hard at all   Food Insecurity: No Food Insecurity (4/1/2025)    Hunger Vital Sign     Worried About Running Out of Food in the Last Year: Never true     Ran Out of Food in the Last Year: Never true   Transportation Needs: No Transportation Needs (4/1/2025)    PRAPARE - Transportation     Lack of Transportation (Medical): No     Lack of Transportation (Non-Medical): No   Physical Activity: Sufficiently Active (4/1/2025)    Exercise Vital Sign     Days of Exercise per Week: 6 days     Minutes of Exercise per Session: 50 min   Stress: No Stress Concern Present (4/1/2025)    [employer] [place] [employer] - Occupational Stress Questionnaire     Feeling of Stress : Not at all   Social Connections: Not on file   Intimate Partner Violence: Not At Risk (4/1/2025)    Humiliation, Afraid, Rape, and Kick questionnaire     Fear of Current or Ex-Partner: No     Emotionally Abused: No     Physically Abused: No     Sexually Abused: No   Housing Stability: Low Risk  (4/1/2025)    Housing Stability Vital Sign     Unable to Pay for Housing in the Last Year: No     Number of Times Moved in the Last Year: 1     Homeless in the Last Year: No     Social Drivers of Health     Tobacco Use: Low Risk  (4/1/2025)    Patient History     Smoking Tobacco Use: Never     Smokeless Tobacco Use: Never     Passive Exposure: Never   Alcohol Use: Not At Risk (4/1/2025)    AUDIT-C     Frequency of Alcohol Consumption: Monthly or less     Average Number of Drinks: 1 or 2     Frequency of Binge Drinking: Never   Financial Resource Strain: Low Risk  (4/1/2025)    Overall Financial Resource Strain (CARDIA)     Difficulty of Paying Living Expenses: Not hard at all   Food Insecurity: No Food Insecurity (4/1/2025)    Hunger Vital Sign     Worried About Running Out of Food in the Last Year: Never true     Ran Out of Food in the Last Year: Never true   Transportation Needs: No Transportation Needs (4/1/2025)    PRAPARE -  Transportation     Lack of Transportation (Medical): No     Lack of Transportation (Non-Medical): No   Physical Activity: Sufficiently Active (4/1/2025)    Exercise Vital Sign     Days of Exercise per Week: 6 days     Minutes of Exercise per Session: 50 min   Stress: No Stress Concern Present (4/1/2025)    [employer] [place] [employer] - Occupational Stress Questionnaire     Feeling of Stress : Not at all   Social Connections: Not on file   Intimate Partner Violence: Not At Risk (4/1/2025)    Humiliation, Afraid, Rape, and Kick questionnaire     Fear of Current or Ex-Partner: No     Emotionally Abused: No     Physically Abused: No     Sexually Abused: No   Depression: Not at risk (4/1/2025)    PHQ-2     PHQ-2 Score: 0   Housing Stability: Low Risk  (4/1/2025)    Housing Stability Vital Sign     Unable to Pay for Housing in the Last Year: No     Number of Times Moved in the Last Year: 1     Homeless in the Last Year: No   Utilities: Not At Risk (4/1/2025)    [facility] Utilities     Threatened with loss of utilities: No   Digital Equity: Not on file   Health Literacy: Not on file     Immunization History   Administered Date(s) Administered    Flu vaccine (IIV4), preservative free *Check age/dose* 1955, 09/24/2017, 09/29/2018, 09/16/2022    Flu vaccine, quadrivalent, high-dose, preservative free, age 65y+ (FLUZONE) 09/22/2020, 09/28/2021, 09/16/2022, 09/18/2023    Flu vaccine, quadrivalent, no egg protein, age 6 month or greater (FLUCELVAX) 10/09/2019    Flu vaccine, trivalent, preservative free, HIGH-DOSE, age 65y+ (Fluzone) 11/18/2024    Flu vaccine, trivalent, preservative free, age 6 months and greater (Fluarix/Fluzone/Flulaval) 10/10/2015    Hep A / Hep B 05/22/2014    Hep A, Unspecified 05/22/2014    Influenza, Seasonal, Quadrivalent, Adjuvanted 09/18/2023    Influenza, seasonal, injectable 10/23/2016    Moderna COVID-19 vaccine, 12 years and older (50mcg/0.5mL)(Spikevax) 10/17/2023, 09/24/2024     Moderna COVID-19 vaccine, bivalent, blue cap/gray label *Check age/dose* 10/04/2022, 10/14/2022    Moderna SARS-CoV-2 Vaccination 02/15/2021, 2021, 03/15/2021, 2021, 10/23/2021, 2022    Pneumococcal conjugate vaccine, 13-valent (PREVNAR 13) 2022    Pneumococcal conjugate vaccine, 20-valent (PREVNAR 20) 2022, 10/03/2023    Pneumococcal polysaccharide vaccine, 23-valent, age 2 years and older (PNEUMOVAX 23) 2017    Pneumococcal, Unspecified 2022    RSV, 60 Years And Older (AREXVY) 2024    Tdap vaccine, age 7 year and older (BOOSTRIX, ADACEL) 2014, 2025    Zoster vaccine, recombinant, adult (SHINGRIX) 10/19/2018, 2024     Family History   Problem Relation Name Age of Onset    Arthritis Mother      Heart failure Mother      Coronary artery disease Father      Aneurysm Father      Polymyositis Father      Breast cancer Paternal Grandmother  60    Polymyositis Other      Depression Other      Heart failure Other       Past Surgical History:   Procedure Laterality Date     SECTION, CLASSIC  2014     Section    COLONOSCOPY  2019    Complete Colonoscopy    FOOT SURGERY  2014    Foot Surgery    MANDIBLE SURGERY  2014    Jaw Surgery    TONSILLECTOMY  2014    Tonsillectomy       Current Outpatient Medications:     aspirin 81 mg EC tablet, Take 1 tablet (81 mg) by mouth. Take every Mon, Wed & Fri, Disp: , Rfl:     CALCIUM ORAL, Take by mouth., Disp: , Rfl:     cholecalciferol (Vitamin D-3) 125 MCG (5000 UT) capsule, Take 1 capsule (125 mcg) by mouth once daily., Disp: , Rfl:     clotrimazole-betamethasone (Lotrisone) cream, Apply 1 Application topically 2 times a day., Disp: 45 g, Rfl: 0    lisinopril 20 mg tablet, TAKE 1 TABLET BY MOUTH EVERY DAY, Disp: 90 tablet, Rfl: 0    magnesium oxide 500 mg magnesium tablet, Take 1 tablet (500 mg) by mouth once daily., Disp: , Rfl:     metFORMIN  mg 24 hr tablet, TAKE 1  TABLET BY MOUTH EVERY DAY, Disp: 90 tablet, Rfl: 0    vitamin E acetate (VITAMIN E ORAL), Take 400 Units by mouth once daily., Disp: , Rfl:     amLODIPine (Norvasc) 5 mg tablet, Take 1 tablet (5 mg) by mouth once daily., Disp: 90 tablet, Rfl: 1    rosuvastatin (Crestor) 5 mg tablet, Take 1 tablet (5 mg) by mouth once daily., Disp: 90 tablet, Rfl: 0  Allergies   Allergen Reactions    Codeine Diarrhea, Itching and Rash    Shrimp Rash     Patient Active Problem List   Diagnosis    Arthralgia of multiple joints    Benign hypertension    Cerebrovascular small vessel disease    Polyp of colon    Steatosis of liver    Post menopausal syndrome    Hyperlipidemia associated with type 2 diabetes mellitus    Chronic bilateral low back pain without sciatica    Cobalamin deficiency    Type 2 diabetes mellitus with hyperglycemia, without long-term current use of insulin       Lab Results   Component Value Date    WBC 3.9 (L) 01/31/2024    HGB 12.1 01/31/2024    HCT 37.5 01/31/2024     01/31/2024    CHOL 156 01/31/2024    TRIG 75 01/31/2024    HDL 69.9 01/31/2024    ALT 9 01/31/2024    AST 13 01/31/2024     01/31/2024    K 4.4 01/31/2024     01/31/2024    CREATININE 1.02 01/31/2024    BUN 18 01/31/2024    CO2 28 01/31/2024    TSH 2.13 01/31/2024    HGBA1C 6.0 11/18/2024     Social Drivers of Health     Tobacco Use: Low Risk  (4/1/2025)    Patient History     Smoking Tobacco Use: Never     Smokeless Tobacco Use: Never     Passive Exposure: Never   Alcohol Use: Not At Risk (4/1/2025)    AUDIT-C     Frequency of Alcohol Consumption: Monthly or less     Average Number of Drinks: 1 or 2     Frequency of Binge Drinking: Never   Financial Resource Strain: Low Risk  (4/1/2025)    Overall Financial Resource Strain (CARDIA)     Difficulty of Paying Living Expenses: Not hard at all   Food Insecurity: No Food Insecurity (4/1/2025)    Hunger Vital Sign     Worried About Running Out of Food in the Last Year: Never true     Ran  "Out of Food in the Last Year: Never true   Transportation Needs: No Transportation Needs (4/1/2025)    PRAPARE - Transportation     Lack of Transportation (Medical): No     Lack of Transportation (Non-Medical): No   Physical Activity: Sufficiently Active (4/1/2025)    Exercise Vital Sign     Days of Exercise per Week: 6 days     Minutes of Exercise per Session: 50 min   Stress: No Stress Concern Present (4/1/2025)    [employer] [place] [employer] - Occupational Stress Questionnaire     Feeling of Stress : Not at all   Social Connections: Not on file   Intimate Partner Violence: Not At Risk (4/1/2025)    Humiliation, Afraid, Rape, and Kick questionnaire     Fear of Current or Ex-Partner: No     Emotionally Abused: No     Physically Abused: No     Sexually Abused: No   Depression: Not at risk (4/1/2025)    PHQ-2     PHQ-2 Score: 0   Housing Stability: Low Risk  (4/1/2025)    Housing Stability Vital Sign     Unable to Pay for Housing in the Last Year: No     Number of Times Moved in the Last Year: 1     Homeless in the Last Year: No   Utilities: Not At Risk (4/1/2025)    [facility] Utilities     Threatened with loss of utilities: No   Digital Equity: Not on file   Health Literacy: Not on file       Review of Systems    Objective   Vitals:  /79   Pulse 72   Temp 36.6 °C (97.9 °F) (Oral)   Ht 1.6 m (5' 3\")   Wt 70.9 kg (156 lb 3.2 oz)   LMP 10/03/2008 (Approximate)   SpO2 98%   BMI 27.67 kg/m²       Physical Exam    Assessment & Plan  Routine general medical examination at health care facility    Orders:    1 Year Follow Up In Advanced [facility] Care - PCP - Wellness Exam; Future    Comprehensive Metabolic Panel; Future    Hemoglobin A1C - Lab collect; Future    Vitamin B12; Future    Vitamin D 25-Hydroxy,Total (for eval of Vitamin D levels); Future    Magnesium; Future    TSH with reflex to Free T4 if abnormal; Future    Type 2 diabetes mellitus with hyperglycemia, without long-term current use of " insulin    Orders:    Hemoglobin A1C - Lab collect; Future    Vitamin B12; Future    Vitamin D 25-Hydroxy,Total (for eval of Vitamin D levels); Future    Magnesium; Future    TSH with reflex to Free T4 if abnormal; Future    POCT glycosylated hemoglobin (Hb A1C) manually resulted    Diabetes mellitus screening    Orders:    Urine Microalbumin - Lab collect; Future    Screening for cardiovascular condition  The 10-year ASCVD risk score (Maricarmen HICKS, et al., 2019) is: 17.5%    Values used to calculate the score:      Age: 70 years      Sex: Female      Is Non- : No      Diabetic: Yes      Tobacco smoker: No      Systolic Blood Pressure: 117 mmHg      Is BP treated: Yes      HDL Cholesterol: 69.9 mg/dL      Total Cholesterol: 156 mg/dL  Time spent was 10 mins reviewing  Orders:    CT cardiac scoring wo IV contrast; Future    Cardiac risk counseling  The 10-year ASCVD risk score (Maricarmen HICKS, et al., 2019) is: 17.5%    Values used to calculate the score:      Age: 70 years      Sex: Female      Is Non- : No      Diabetic: Yes      Tobacco smoker: No      Systolic Blood Pressure: 117 mmHg      Is BP treated: Yes      HDL Cholesterol: 69.9 mg/dL      Total Cholesterol: 156 mg/dL  Time spent was 10 mins reviewing       Asymptomatic menopausal state    Orders:    XR DEXA bone density; Future    Encounter for screening mammogram for breast cancer    Orders:    BI mammo bilateral screening tomosynthesis; Future    Screening for hyperlipidemia    Orders:    Lipid Panel; Future    Vitamin B 12 deficiency    Orders:    Vitamin B12; Future    Vitamin D deficiency    Orders:    Vitamin D 25-Hydroxy,Total (for eval of Vitamin D levels); Future    Hyperlipidemia associated with type 2 diabetes mellitus    Orders:    rosuvastatin (Crestor) 5 mg tablet; Take 1 tablet (5 mg) by mouth once daily.    Benign hypertension    Orders:    amLODIPine (Norvasc) 5 mg tablet; Take 1 tablet (5 mg) by  mouth once daily.           Advance Directives Discussion  16 - 20 minutes were spent discussing Advanced Care Planning (including a Living Will, Medical Power Of , as well as specific end of life choices and/or directives). The details of that discussion were documented in Advanced Directives Discussion section of the medical record.     Alcohol Misuse Screen  5 - 10 minutes were spent screening the patient for alcohol misuse disorder.    Cardiac Risk Assessment  15 - 20 minutes were spent discussing Cardiovascular risk and, if needed, lifestyle modifications were recommended, including nutritional choices, exercise, and elimination of habits contributing to risk.   Aspirin use/disuse was discussed following the guidelines below:  low dose ASA ( mg) should be considered:    If prior Heart Attack/Stroke/Peripheral vascular disease:  Generally recommend daily low dose aspirin unless extremely high bleeding risk (e.g., gastrointestinal).    If no prior Heart Attack/Stroke/Peripheral vascular disease:              Age over 70: Do not use Aspirin for prevention    Age less than 70 and 10-year cardiovascular disease risk is >20%: use low dose Aspirin for prevention.                 Depression Screening  5 - 10 minutes were spent screening for depression.       Specialty Consultation notes reviewed  Labs reviewed  Refills sent in  F/U in 3 months for HTN and Diabetes  Continue current medications  Call if any new concerns

## 2025-04-01 NOTE — ASSESSMENT & PLAN NOTE
Orders:    Hemoglobin A1C - Lab collect; Future    Vitamin B12; Future    Vitamin D 25-Hydroxy,Total (for eval of Vitamin D levels); Future    Magnesium; Future    TSH with reflex to Free T4 if abnormal; Future    POCT glycosylated hemoglobin (Hb A1C) manually resulted

## 2025-04-01 NOTE — PATIENT INSTRUCTIONS
Labs reviewed  Refills sent in  F/U in 3 months for HTN and Diabetes  Continue current medications  Call if any new concerns    Current Outpatient Medications   Medication Sig Dispense Refill    aspirin 81 mg EC tablet Take 1 tablet (81 mg) by mouth. Take every Mon, Wed & Fri      CALCIUM ORAL Take by mouth.      cholecalciferol (Vitamin D-3) 125 MCG (5000 UT) capsule Take 1 capsule (125 mcg) by mouth once daily.      clotrimazole-betamethasone (Lotrisone) cream Apply 1 Application topically 2 times a day. 45 g 0    lisinopril 20 mg tablet TAKE 1 TABLET BY MOUTH EVERY DAY 90 tablet 0    magnesium oxide 500 mg magnesium tablet Take 1 tablet (500 mg) by mouth once daily.      metFORMIN  mg 24 hr tablet TAKE 1 TABLET BY MOUTH EVERY DAY 90 tablet 0    vitamin E acetate (VITAMIN E ORAL) Take 400 Units by mouth once daily.      amLODIPine (Norvasc) 5 mg tablet Take 1 tablet (5 mg) by mouth once daily. 90 tablet 1    rosuvastatin (Crestor) 5 mg tablet Take 1 tablet (5 mg) by mouth once daily. 90 tablet 0     No current facility-administered medications for this visit.

## 2025-05-01 DIAGNOSIS — G89.29 CHRONIC PAIN OF RIGHT KNEE: ICD-10-CM

## 2025-05-01 DIAGNOSIS — M25.561 CHRONIC PAIN OF RIGHT KNEE: ICD-10-CM

## 2025-05-01 DIAGNOSIS — M19.90 ARTHRITIS: ICD-10-CM

## 2025-05-05 ENCOUNTER — APPOINTMENT (OUTPATIENT)
Dept: RADIOLOGY | Facility: CLINIC | Age: 70
End: 2025-05-05
Payer: MEDICARE

## 2025-05-19 ENCOUNTER — APPOINTMENT (OUTPATIENT)
Dept: RADIOLOGY | Facility: CLINIC | Age: 70
End: 2025-05-19
Payer: MEDICARE

## 2025-05-19 ENCOUNTER — HOSPITAL ENCOUNTER (OUTPATIENT)
Dept: RADIOLOGY | Facility: CLINIC | Age: 70
Discharge: HOME | End: 2025-05-19
Payer: MEDICARE

## 2025-05-19 VITALS — BODY MASS INDEX: 26.12 KG/M2 | WEIGHT: 153 LBS | HEIGHT: 64 IN

## 2025-05-19 DIAGNOSIS — Z78.0 ASYMPTOMATIC MENOPAUSAL STATE: ICD-10-CM

## 2025-05-19 DIAGNOSIS — Z12.31 ENCOUNTER FOR SCREENING MAMMOGRAM FOR BREAST CANCER: ICD-10-CM

## 2025-05-19 PROCEDURE — 77080 DXA BONE DENSITY AXIAL: CPT

## 2025-05-19 PROCEDURE — 77080 DXA BONE DENSITY AXIAL: CPT | Performed by: STUDENT IN AN ORGANIZED HEALTH CARE EDUCATION/TRAINING PROGRAM

## 2025-05-19 PROCEDURE — 77067 SCR MAMMO BI INCL CAD: CPT

## 2025-05-19 PROCEDURE — 77063 BREAST TOMOSYNTHESIS BI: CPT | Performed by: RADIOLOGY

## 2025-05-19 PROCEDURE — 77067 SCR MAMMO BI INCL CAD: CPT | Performed by: RADIOLOGY

## 2025-06-01 DIAGNOSIS — E11.65 TYPE 2 DIABETES MELLITUS WITH HYPERGLYCEMIA, WITHOUT LONG-TERM CURRENT USE OF INSULIN: ICD-10-CM

## 2025-06-01 DIAGNOSIS — I10 BENIGN HYPERTENSION: ICD-10-CM

## 2025-06-02 RX ORDER — METFORMIN HYDROCHLORIDE 500 MG/1
500 TABLET, EXTENDED RELEASE ORAL DAILY
Qty: 90 TABLET | Refills: 0 | Status: SHIPPED | OUTPATIENT
Start: 2025-06-02

## 2025-06-02 RX ORDER — LISINOPRIL 20 MG/1
20 TABLET ORAL DAILY
Qty: 90 TABLET | Refills: 0 | Status: SHIPPED | OUTPATIENT
Start: 2025-06-02

## 2025-06-29 DIAGNOSIS — E11.69 HYPERLIPIDEMIA ASSOCIATED WITH TYPE 2 DIABETES MELLITUS: ICD-10-CM

## 2025-06-29 DIAGNOSIS — E78.5 HYPERLIPIDEMIA ASSOCIATED WITH TYPE 2 DIABETES MELLITUS: ICD-10-CM

## 2025-06-30 RX ORDER — ROSUVASTATIN CALCIUM 5 MG/1
5 TABLET, COATED ORAL DAILY
Qty: 90 TABLET | Refills: 0 | Status: SHIPPED | OUTPATIENT
Start: 2025-06-30

## 2025-07-02 ENCOUNTER — APPOINTMENT (OUTPATIENT)
Dept: PRIMARY CARE | Facility: CLINIC | Age: 70
End: 2025-07-02
Payer: MEDICARE

## 2025-08-15 LAB
25(OH)D3+25(OH)D2 SERPL-MCNC: 42 NG/ML (ref 30–100)
ALBUMIN SERPL-MCNC: 4.2 G/DL (ref 3.6–5.1)
ALBUMIN/CREAT UR: 7 MG/G CREAT
ALP SERPL-CCNC: 44 U/L (ref 37–153)
ALT SERPL-CCNC: 8 U/L (ref 6–29)
ANION GAP SERPL CALCULATED.4IONS-SCNC: 9 MMOL/L (CALC) (ref 7–17)
AST SERPL-CCNC: 15 U/L (ref 10–35)
BILIRUB SERPL-MCNC: 0.7 MG/DL (ref 0.2–1.2)
BUN SERPL-MCNC: 19 MG/DL (ref 7–25)
CALCIUM SERPL-MCNC: 8.9 MG/DL (ref 8.6–10.4)
CHLORIDE SERPL-SCNC: 98 MMOL/L (ref 98–110)
CHOLEST SERPL-MCNC: 259 MG/DL
CHOLEST/HDLC SERPL: 3.4 (CALC)
CO2 SERPL-SCNC: 25 MMOL/L (ref 20–32)
CREAT SERPL-MCNC: 1.11 MG/DL (ref 0.6–1)
CREAT UR-MCNC: 242 MG/DL (ref 20–275)
EGFRCR SERPLBLD CKD-EPI 2021: 53 ML/MIN/1.73M2
EST. AVERAGE GLUCOSE BLD GHB EST-MCNC: 131 MG/DL
EST. AVERAGE GLUCOSE BLD GHB EST-SCNC: 7.3 MMOL/L
GLUCOSE SERPL-MCNC: 86 MG/DL (ref 65–99)
HBA1C MFR BLD: 6.2 %
HDLC SERPL-MCNC: 76 MG/DL
LDLC SERPL CALC-MCNC: 160 MG/DL (CALC)
MAGNESIUM SERPL-MCNC: 2 MG/DL (ref 1.5–2.5)
MICROALBUMIN UR-MCNC: 1.6 MG/DL
NONHDLC SERPL-MCNC: 183 MG/DL (CALC)
POTASSIUM SERPL-SCNC: 4.3 MMOL/L (ref 3.5–5.3)
PROT SERPL-MCNC: 6.7 G/DL (ref 6.1–8.1)
SODIUM SERPL-SCNC: 132 MMOL/L (ref 135–146)
TRIGL SERPL-MCNC: 109 MG/DL
TSH SERPL-ACNC: 1.46 MIU/L (ref 0.4–4.5)
VIT B12 SERPL-MCNC: 354 PG/ML (ref 200–1100)

## 2025-08-18 ENCOUNTER — APPOINTMENT (OUTPATIENT)
Dept: PRIMARY CARE | Facility: CLINIC | Age: 70
End: 2025-08-18
Payer: MEDICARE

## 2025-08-18 VITALS
HEIGHT: 64 IN | SYSTOLIC BLOOD PRESSURE: 126 MMHG | BODY MASS INDEX: 26.46 KG/M2 | TEMPERATURE: 98 F | DIASTOLIC BLOOD PRESSURE: 75 MMHG | HEART RATE: 66 BPM | WEIGHT: 155 LBS | OXYGEN SATURATION: 98 %

## 2025-08-18 DIAGNOSIS — B02.9 HERPES ZOSTER WITHOUT COMPLICATION: ICD-10-CM

## 2025-08-18 DIAGNOSIS — E11.69 HYPERLIPIDEMIA ASSOCIATED WITH TYPE 2 DIABETES MELLITUS: ICD-10-CM

## 2025-08-18 DIAGNOSIS — I10 BENIGN HYPERTENSION: Primary | ICD-10-CM

## 2025-08-18 DIAGNOSIS — E53.8 COBALAMIN DEFICIENCY: ICD-10-CM

## 2025-08-18 DIAGNOSIS — N18.31 STAGE 3A CHRONIC KIDNEY DISEASE (MULTI): ICD-10-CM

## 2025-08-18 DIAGNOSIS — E11.65 TYPE 2 DIABETES MELLITUS WITH HYPERGLYCEMIA, WITHOUT LONG-TERM CURRENT USE OF INSULIN: ICD-10-CM

## 2025-08-18 DIAGNOSIS — R25.2 BILATERAL LEG CRAMPS: ICD-10-CM

## 2025-08-18 DIAGNOSIS — E11.65 TYPE 2 DIABETES MELLITUS WITH HYPERGLYCEMIA, WITHOUT LONG-TERM CURRENT USE OF INSULIN: Primary | ICD-10-CM

## 2025-08-18 DIAGNOSIS — E78.5 HYPERLIPIDEMIA ASSOCIATED WITH TYPE 2 DIABETES MELLITUS: ICD-10-CM

## 2025-08-18 DIAGNOSIS — E87.1 HYPONATREMIA: ICD-10-CM

## 2025-08-18 PROCEDURE — 3074F SYST BP LT 130 MM HG: CPT | Performed by: FAMILY MEDICINE

## 2025-08-18 PROCEDURE — 99215 OFFICE O/P EST HI 40 MIN: CPT | Performed by: FAMILY MEDICINE

## 2025-08-18 PROCEDURE — 99214 OFFICE O/P EST MOD 30 MIN: CPT | Performed by: FAMILY MEDICINE

## 2025-08-18 PROCEDURE — 3078F DIAST BP <80 MM HG: CPT | Performed by: FAMILY MEDICINE

## 2025-08-18 PROCEDURE — 4010F ACE/ARB THERAPY RXD/TAKEN: CPT | Performed by: FAMILY MEDICINE

## 2025-08-18 PROCEDURE — 1160F RVW MEDS BY RX/DR IN RCRD: CPT | Performed by: FAMILY MEDICINE

## 2025-08-18 PROCEDURE — 3044F HG A1C LEVEL LT 7.0%: CPT | Performed by: FAMILY MEDICINE

## 2025-08-18 PROCEDURE — 3008F BODY MASS INDEX DOCD: CPT | Performed by: FAMILY MEDICINE

## 2025-08-18 PROCEDURE — 1159F MED LIST DOCD IN RCRD: CPT | Performed by: FAMILY MEDICINE

## 2025-08-18 PROCEDURE — 1036F TOBACCO NON-USER: CPT | Performed by: FAMILY MEDICINE

## 2025-08-18 RX ORDER — ATORVASTATIN CALCIUM 10 MG/1
10 TABLET, FILM COATED ORAL DAILY
Qty: 90 TABLET | Refills: 0 | Status: SHIPPED | OUTPATIENT
Start: 2025-08-18

## 2025-08-18 RX ORDER — ROSUVASTATIN CALCIUM 5 MG/1
5 TABLET, COATED ORAL DAILY
Qty: 90 TABLET | Refills: 1 | Status: CANCELLED | OUTPATIENT
Start: 2025-08-18

## 2025-08-18 RX ORDER — LISINOPRIL 20 MG/1
20 TABLET ORAL DAILY
Qty: 90 TABLET | Refills: 1 | Status: SHIPPED | OUTPATIENT
Start: 2025-08-18

## 2025-08-18 RX ORDER — BACLOFEN 20 MG
1 TABLET ORAL DAILY
Qty: 90 TABLET | Refills: 1 | Status: CANCELLED | OUTPATIENT
Start: 2025-08-18

## 2025-08-18 RX ORDER — METFORMIN HYDROCHLORIDE 500 MG/1
500 TABLET, EXTENDED RELEASE ORAL DAILY
Qty: 90 TABLET | Refills: 0 | Status: SHIPPED | OUTPATIENT
Start: 2025-08-18

## 2025-08-18 RX ORDER — DAPAGLIFLOZIN 5 MG/1
5 TABLET, FILM COATED ORAL DAILY
Qty: 100 TABLET | Refills: 0 | Status: SHIPPED | OUTPATIENT
Start: 2025-08-18

## 2025-08-18 RX ORDER — AMLODIPINE BESYLATE 5 MG/1
5 TABLET ORAL DAILY
Qty: 90 TABLET | Refills: 1 | Status: SHIPPED | OUTPATIENT
Start: 2025-08-18

## 2025-08-18 RX ORDER — VALACYCLOVIR HYDROCHLORIDE 1 G/1
1000 TABLET, FILM COATED ORAL 2 TIMES DAILY
Qty: 20 TABLET | Refills: 0 | Status: SHIPPED | OUTPATIENT
Start: 2025-08-18 | End: 2025-08-28

## 2025-08-18 ASSESSMENT — ENCOUNTER SYMPTOMS
DEPRESSION: 0
OCCASIONAL FEELINGS OF UNSTEADINESS: 0
LOSS OF SENSATION IN FEET: 0

## 2025-09-10 ENCOUNTER — APPOINTMENT (OUTPATIENT)
Dept: PHARMACY | Facility: HOSPITAL | Age: 70
End: 2025-09-10
Payer: MEDICARE

## 2025-12-03 ENCOUNTER — APPOINTMENT (OUTPATIENT)
Dept: PRIMARY CARE | Facility: CLINIC | Age: 70
End: 2025-12-03
Payer: MEDICARE